# Patient Record
Sex: FEMALE | Race: BLACK OR AFRICAN AMERICAN | NOT HISPANIC OR LATINO | Employment: FULL TIME | URBAN - METROPOLITAN AREA
[De-identification: names, ages, dates, MRNs, and addresses within clinical notes are randomized per-mention and may not be internally consistent; named-entity substitution may affect disease eponyms.]

---

## 2017-02-01 ENCOUNTER — PATIENT OUTREACH (OUTPATIENT)
Dept: ADMINISTRATIVE | Facility: HOSPITAL | Age: 32
End: 2017-02-01

## 2017-03-01 ENCOUNTER — PATIENT OUTREACH (OUTPATIENT)
Dept: ADMINISTRATIVE | Facility: HOSPITAL | Age: 32
End: 2017-03-01

## 2017-03-01 DIAGNOSIS — Z00.00 HEALTHCARE MAINTENANCE: Primary | ICD-10-CM

## 2017-03-06 ENCOUNTER — OFFICE VISIT (OUTPATIENT)
Dept: OBSTETRICS AND GYNECOLOGY | Facility: CLINIC | Age: 32
End: 2017-03-06
Payer: COMMERCIAL

## 2017-03-06 VITALS
WEIGHT: 293 LBS | HEIGHT: 67 IN | BODY MASS INDEX: 45.99 KG/M2 | SYSTOLIC BLOOD PRESSURE: 122 MMHG | DIASTOLIC BLOOD PRESSURE: 70 MMHG

## 2017-03-06 DIAGNOSIS — N91.5 OLIGOMENORRHEA: Primary | ICD-10-CM

## 2017-03-06 PROCEDURE — 99214 OFFICE O/P EST MOD 30 MIN: CPT | Mod: S$GLB,,, | Performed by: OBSTETRICS & GYNECOLOGY

## 2017-03-06 PROCEDURE — 99999 PR PBB SHADOW E&M-EST. PATIENT-LVL II: CPT | Mod: PBBFAC,,, | Performed by: OBSTETRICS & GYNECOLOGY

## 2017-03-06 PROCEDURE — 1160F RVW MEDS BY RX/DR IN RCRD: CPT | Mod: S$GLB,,, | Performed by: OBSTETRICS & GYNECOLOGY

## 2017-03-06 NOTE — LETTER
March 6, 2017                     [facility]  [address] [facility] [address]  Phone: [phone]  Fax: [phone]   Patient: Ivana Blair   MR Number: [id number]   YOB: 1985   Date of Visit: 3/6/2017     To whom it may concern :     Ivana Blair was seen in clinic today and is able to return to work today 3/6/17. Feel free to call clinic at [phone]     Sincerely,      Nano Wolfe MA            CC  No Recipients    Enclosure

## 2017-03-06 NOTE — PROGRESS NOTES
CHIEF COMPLAINT:   Chief Complaint   Patient presents with    Gynecologic Exam       HISTORY OF PRESENT ILLNESS    Ivana Blair 32 y.o. G0 complains of: low libido, wt gain, rare periods  She also never had a pap smear as she says pelvic exams and intercourse (which she had just once) are too painful.     Menarche at 18yo, and menses then were q2mo, then stretched out to less frequent, then completely stopped at around age 22. Around then she had been given a provera trial  And bled after this.   Saw  Endocrinologists in past, seeing Mustapha now.   She had an expanding golf balled sized prolactinoma which was resected by Luz 2015 but not all was removed so needs to take twice weekly cabergoline however this is intermittent as it makes her very sleepy.   Eldorado once only 2006. Slightly painful but not excruciating, and also no sensation or pleasure, so did not want it again. No libido.   No abuse as a child.     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     HISTORY  Patient Active Problem List   Diagnosis    Benign prolactinoma       Past Medical History:   Diagnosis Date    Allergy     sinus    Anxiety     B12 deficiency     Benign prolactinoma     s/p surgery    Depression     Hx of varicella     Vitamin D deficiency        Past Surgical History:   Procedure Laterality Date    ADENOIDECTOMY      2007    NASAL JOSE MIGUEL BULLOSA RESECTION      2013    TONSILLECTOMY      2007    TUMOR REMOVAL  2/25/2015    Prolactinoma       Family History   Problem Relation Age of Onset    Anemia Mother     Gout Father     Cancer Maternal Grandfather      prostate    Cataracts Maternal Grandfather     Rheumatologic disease Paternal Grandmother     Cancer Paternal Grandfather      lung    Anemia Sister     Heart disease Maternal Aunt     Alzheimer's disease Maternal Grandmother        Social History     Social History    Marital status: Single     Spouse name: N/A    Number of children:  0    Years of education: N/A     Occupational History    [employer]     Social History Main Topics    Smoking status: Never Smoker    Smokeless tobacco: Never Used    Alcohol use No    Drug use: No    Sexual activity: Not Currently     Partners: Male     Birth control/ protection: None     Other Topics Concern    Are You Pregnant Or Think You May Be? No    Breast-Feeding No     Social History Narrative       No current outpatient prescriptions on file.     No current facility-administered medications for this visit.        Review of patient's allergies indicates:  No Known Allergies        PHYSICAL EXAM     Vitals:    03/06/17 1440   BP: 122/70       PAIN SCALE: 0/10 None    ROS:  GENERAL: No fever, chills, fatigability or weight loss.  ABDOMEN: Appetite fine. No weight loss. Denies diarrhea, abdominal pain, hematemesis or blood in stool.  URINARY: No flank pain, dysuria or hematuria.  BREASTS: Breasts symmetric, nontender and no lumps detected.    PE:   APPEARANCE: Well nourished, well developed, in no acute distress.        DIAGNOSIS:   1. Oligomenorrhea  2. History current prolactinemia, with hx resection of prolactinoma  3. obesity  4. Low libido    PLAN:   Pelvic u/s for EMS  Keep appt with DrCandice - pt needs to return to cabergoline to give this med a full trial (working around her job and travel days) and ask for alternative if there is no improvement of side eeffects.   Libido and menses irregularity may be due to her prolatin level.       COUNSELING:  Patient was counseled today on A.C.S. Pap guidelines and recommendations for yearly pelvic exams, mammograms and monthly self breast exams; to see her PCP for other health maintenance.     FOLLOW-UP: With me to review u/s results and to get a full annual, pap with small speculum.   Patient was counseled on above diagnoses (with over 50% of the time spent on education) during her   25 minute visit.

## 2017-03-06 NOTE — MR AVS SNAPSHOT
[facility]  [address] [facility] [address]  Phone: [phone]  Fax: [phone]                  Ivana Blair   3/6/2017 2:15 PM   Office Visit    Description:  Female : 1985   Provider:  Steph Thompson MD   Department:  [facility]           Reason for Visit     Gynecologic Exam           Diagnoses this Visit        Comments    Oligomenorrhea    -  Primary            To Do List           Future Appointments        Provider Department Dept Phone    3/8/2017 2:00 PM [facility] [phone]    3/9/2017 2:40 PM Prosper Bishop MD [place] Medicine [phone]    3/23/2017 10:45 AM Steph Thompson MD [facility] - OB/ -[phone]    3/27/2017 2:30 PM Nydia Chauhan MD [facility] - Dermatology [phone]      Goals (5 Years of Data)     None      [facility]     [facility] Nurse Care Line -  Assistance  Registered nurses in the [facility] provide clinical advisement, health education, appointment booking, and other advisory services.  Call for this free service at [phone].             Medications           Message regarding Medications     Verify the changes and/or additions to your medication regime listed below are the same as discussed with your clinician today.  If any of these changes or additions are incorrect, please notify your healthcare provider.        STOP taking these medications     cabergoline (DOSTINEX) 0.5 mg tablet Take 0.5 mg by mouth.    fluticasone (FLONASE) 50 mcg/actuation nasal spray 2 sprays by Each Nare route once daily.           Verify that the below list of medications is an accurate representation of the medications you are currently taking.  If none reported, the list may be blank. If incorrect, please contact your healthcare provider. Carry this list with you in case of emergency.           Current Medications            Clinical Reference Information     "       Your Vitals Were     BP Height Weight BMI       122/70 5' 7" (1.702 m) 132.9 kg (293 lb) 45.89 kg/m2       Blood Pressure          Most Recent Value    BP  122/70      Allergies as of 3/6/2017     No Known Allergies      Immunizations Administered on Date of Encounter - 3/6/2017     None      Orders Placed During Today's Visit     Future Labs/Procedures Expected by Expires    US OB/GYN Procedure ([facility])  As directed 3/6/2018      Language Assistance Services     ATTENTION: Language assistance services are available, free of charge. Please call [phone].      ATENCIÓN: Si habla español, tiene a lang disposición servicios gratuitos de asistencia lingüística. Llame al [phone].     CHÚ Ý: N?u b?n nói Ti?ng Vi?t, có các d?ch v? h? tr? ngôn ng? mi?n phí dành cho b?n. G?i s? [phone].         [facility] complies with applicable Federal civil rights laws and does not discriminate on the basis of race, color, national origin, age, disability, or sex.        "

## 2017-03-07 ENCOUNTER — TELEPHONE (OUTPATIENT)
Dept: RADIOLOGY | Facility: HOSPITAL | Age: 32
End: 2017-03-07

## 2017-03-07 NOTE — TELEPHONE ENCOUNTER
Patient returned call to Radiology Department to confirm appointment. Patient instructed to drink 32 ounces of water and to have it completed 1 hour prior appointment time. Hold bladder. Patient verbalized understanding and confirmed appointment for Ultrasound.

## 2017-03-08 ENCOUNTER — HOSPITAL ENCOUNTER (OUTPATIENT)
Dept: RADIOLOGY | Facility: HOSPITAL | Age: 32
Discharge: HOME | End: 2017-03-08
Attending: OBSTETRICS & GYNECOLOGY
Payer: COMMERCIAL

## 2017-03-08 ENCOUNTER — TELEPHONE (OUTPATIENT)
Dept: OBSTETRICS AND GYNECOLOGY | Facility: CLINIC | Age: 32
End: 2017-03-08

## 2017-03-08 DIAGNOSIS — N91.5 OLIGOMENORRHEA: Primary | ICD-10-CM

## 2017-03-08 DIAGNOSIS — N91.5 OLIGOMENORRHEA: ICD-10-CM

## 2017-03-10 ENCOUNTER — TELEPHONE (OUTPATIENT)
Dept: RADIOLOGY | Facility: HOSPITAL | Age: 32
End: 2017-03-10

## 2017-03-11 ENCOUNTER — PATIENT MESSAGE (OUTPATIENT)
Dept: OBSTETRICS AND GYNECOLOGY | Facility: CLINIC | Age: 32
End: 2017-03-11

## 2017-03-16 ENCOUNTER — TELEPHONE (OUTPATIENT)
Dept: RADIOLOGY | Facility: HOSPITAL | Age: 32
End: 2017-03-16

## 2017-03-20 ENCOUNTER — TELEPHONE (OUTPATIENT)
Dept: RADIOLOGY | Facility: HOSPITAL | Age: 32
End: 2017-03-20

## 2017-03-21 ENCOUNTER — OFFICE VISIT (OUTPATIENT)
Dept: DERMATOLOGY | Facility: CLINIC | Age: 32
End: 2017-03-21
Payer: COMMERCIAL

## 2017-03-21 ENCOUNTER — HOSPITAL ENCOUNTER (OUTPATIENT)
Dept: RADIOLOGY | Facility: HOSPITAL | Age: 32
Discharge: HOME | End: 2017-03-21
Attending: OBSTETRICS & GYNECOLOGY
Payer: COMMERCIAL

## 2017-03-21 DIAGNOSIS — L83 ACQUIRED ACANTHOSIS NIGRICANS: Primary | ICD-10-CM

## 2017-03-21 DIAGNOSIS — L81.0 POST-INFLAMMATORY HYPERPIGMENTATION: ICD-10-CM

## 2017-03-21 PROCEDURE — 76830 TRANSVAGINAL US NON-OB: CPT | Mod: 26,,, | Performed by: RADIOLOGY

## 2017-03-21 PROCEDURE — 1160F RVW MEDS BY RX/DR IN RCRD: CPT | Mod: S$GLB,,, | Performed by: DERMATOLOGY

## 2017-03-21 PROCEDURE — 76856 US EXAM PELVIC COMPLETE: CPT | Mod: 26,,, | Performed by: RADIOLOGY

## 2017-03-21 PROCEDURE — 99213 OFFICE O/P EST LOW 20 MIN: CPT | Mod: S$GLB,,, | Performed by: DERMATOLOGY

## 2017-03-21 PROCEDURE — 76856 US EXAM PELVIC COMPLETE: CPT | Mod: TC,PO

## 2017-03-21 PROCEDURE — 99999 PR PBB SHADOW E&M-EST. PATIENT-LVL II: CPT | Mod: PBBFAC,,, | Performed by: DERMATOLOGY

## 2017-03-21 RX ORDER — SALICYLIC ACID 6 G/100ML
LOTION TOPICAL
Qty: 414 ML | Refills: 3 | Status: SHIPPED | OUTPATIENT
Start: 2017-03-21 | End: 2018-03-29

## 2017-03-21 RX ORDER — CABERGOLINE 0.5 MG/1
0.25 TABLET ORAL
COMMUNITY
End: 2017-10-20

## 2017-03-21 RX ORDER — AMMONIUM LACTATE 12 G/100G
LOTION TOPICAL
Qty: 225 G | Refills: 11 | Status: SHIPPED | OUTPATIENT
Start: 2017-03-21 | End: 2017-08-21 | Stop reason: ALTCHOICE

## 2017-03-21 NOTE — MR AVS SNAPSHOT
[facility]  [address] [place] [address]  Phone: [phone]  Fax: [phone]                  Ivana Blair   3/21/2017 8:45 AM   Office Visit    Description:  Female : 1985   Provider:  Nydia Chauhan MD   Department:  [facility]           Reason for Visit     Follow-up           Diagnoses this Visit        Comments    Acquired acanthosis nigricans    -  Primary     Post-inflammatory hyperpigmentation                To Do List           Future Appointments        Provider Department Dept Phone    3/21/2017 2:45 PM [facility] [phone]    3/23/2017 10:45 AM Steph Thompson MD [facility] - OB/ -[phone]    3/28/2017 3:00 PM Prosper Bishop MD [place]-Internal Medicine [phone]      Goals (5 Years of Data)     None      Follow-Up and Disposition     Return if symptoms worsen or fail to improve.       These Medications        Disp Refills Start End    ammonium lactate (AMLACTIN) 12 % lotion 225 g 11 3/21/2017     Use daily.  Apply to damp skin after bathing.    [facility] [address] [place] [address] [address] [facility]  [place] Ph #: [phone]       salicylic acid 6 % Lotn 414 mL 3 3/21/2017     AAA of elbows and neck twice daily as tolerated    Pharmacy: [facility] [place] [address] Ph #: [phone]         [facility] On Call     [facility] Nurse Care Line -  Assistance  Registered nurses in the [facility] provide clinical advisement, health education, appointment booking, and other advisory services.  Call for this free service at [phone].             Medications           Message regarding Medications     Verify the changes and/or additions to your medication regime listed below are the same as discussed with your clinician today.  If any of these changes or additions are incorrect, please notify your healthcare  provider.        START taking these NEW medications        Refills    ammonium lactate (AMLACTIN) 12 % lotion 11    Sig: Use daily.  Apply to damp skin after bathing.    Class: Normal    salicylic acid 6 % Lotn 3    Sig: AAA of elbows and neck twice daily as tolerated    Class: Normal           Verify that the below list of medications is an accurate representation of the medications you are currently taking.  If none reported, the list may be blank. If incorrect, please contact your healthcare provider. Carry this list with you in case of emergency.           Current Medications     cabergoline (DOSTINEX) 0.5 mg tablet Take 0.25 mg by mouth twice a week.    ammonium lactate (AMLACTIN) 12 % lotion Use daily.  Apply to damp skin after bathing.    salicylic acid 6 % Lotn AAA of elbows and neck twice daily as tolerated           Clinical Reference Information           Allergies as of 3/21/2017     No Known Allergies      Immunizations Administered on Date of Encounter - 3/21/2017     None      Instructions    XEROSIS (DRY SKIN)      1. Definition    Xerosis is the term for dry skin.  We all have a natural oil coating over our skin produced by the skin oil glands.  If this oil is removed, the skin becomes dry which can lead to cracking, which can lead to inflammation.  Xerosis is usually a long-term problem that recurs often, especially in the winter.    2. Cause     Long hot baths or showers can remove our natural oil and lead to xerosis.  One should never take more than one bath or shower a day and for no longer than ten minutes.   Use of harsh soaps such as Zest, Dial, Latvian Spring, Lever and Ivory can worsen and cause xerosis.   Cold winter weather worsens xerosis because the amount of moisture contained in cold air is much less than the amount of moisture in warm air.    3. Treatment     Treatment is intended to restore the natural oil to your skin.  Keep the skin lubricated.     Do not take more than one  bath or shower a day.  Use lukewarm water, not hot.  Hot water dries out the skin.     Use a gentle moisturizing soap such as Cetaphil soap, Dove, or Cetaphil Restoraderm cleanser.     When toweling dry, dont rub.  Blot the skin so there is still some water left on the skin.  You should apply a moisturizing cream to all of the skin such as Cerave cream, Cetaphil cream, Restoraderm or Eucerin Original Formula cream.   Alpha hydroxyacid lotions, i.e., AmLactin, also work very well for preventing dry skin, but may burn when used on inflamed or reddened skin.      [facility]   DERMATOLOGY  [address] [place] [address]    Dept: [phone]   Dept Fax: [phone]           Language Assistance Services     ATTENTION: Language assistance services are available, free of charge. Please call [phone].      ATENCIÓN: Si habla jimena, tiene a lang disposición servicios gratuitos de asistencia lingüística. Llame al [phone].     CHÚ Ý: N?u b?n nói Ti?ng Vi?t, có các d?ch v? h? tr? ngôn ng? mi?n phí dành cho b?n. G?i s? [phone].         [facility] Dermatology complies with applicable Federal civil rights laws and does not discriminate on the basis of race, color, national origin, age, disability, or sex.

## 2017-03-21 NOTE — PATIENT INSTRUCTIONS
XEROSIS (DRY SKIN)      1. Definition    Xerosis is the term for dry skin.  We all have a natural oil coating over our skin produced by the skin oil glands.  If this oil is removed, the skin becomes dry which can lead to cracking, which can lead to inflammation.  Xerosis is usually a long-term problem that recurs often, especially in the winter.    2. Cause     Long hot baths or showers can remove our natural oil and lead to xerosis.  One should never take more than one bath or shower a day and for no longer than ten minutes.   Use of harsh soaps such as Zest, Dial, Radha Spring, Lever and Ivory can worsen and cause xerosis.   Cold winter weather worsens xerosis because the amount of moisture contained in cold air is much less than the amount of moisture in warm air.    3. Treatment     Treatment is intended to restore the natural oil to your skin.  Keep the skin lubricated.     Do not take more than one bath or shower a day.  Use lukewarm water, not hot.  Hot water dries out the skin.     Use a gentle moisturizing soap such as Cetaphil soap, Dove, or Cetaphil Restoraderm cleanser.     When toweling dry, dont rub.  Blot the skin so there is still some water left on the skin.  You should apply a moisturizing cream to all of the skin such as Cerave cream, Cetaphil cream, Restoraderm or Eucerin Original Formula cream.   Alpha hydroxyacid lotions, i.e., AmLactin, also work very well for preventing dry skin, but may burn when used on inflamed or reddened skin.      [facility]   DERMATOLOGY  [address] [facility] [place] [address]    Dept: [phone]   Dept Fax: [phone]

## 2017-03-21 NOTE — PROGRESS NOTES
Subjective:       Patient ID:  Ivana Blair is a 32 y.o. female who presents for   Chief Complaint   Patient presents with    Follow-up     f/u on dark spots      HPI Comments: Hx of benign prolactinoma and acanthosis nigricans, last seen on 10/20/15.  She has a hx of AN of the bilateral elbows, posterior neck and bilateral feet.  She has been using compounded HQ cream every other night.  + improvement with lightening of areas.  She c/o new lesions of the left leg.       Review of Systems   Constitutional: Negative for fever and chills.   Gastrointestinal: Negative for nausea and vomiting.   Skin: Positive for rash. Negative for itching, daily sunscreen use, activity-related sunscreen use and recent sunburn.   Hematologic/Lymphatic: Does not bruise/bleed easily.        Objective:    Physical Exam   Constitutional: She appears well-developed and well-nourished. No distress.   Neurological: She is alert and oriented to person, place, and time. She is not disoriented.   Psychiatric: She has a normal mood and affect.   Skin:   Areas Examined (abnormalities noted in diagram):   Head / Face Inspection Performed  Neck Inspection Performed  Chest / Axilla Inspection Performed  Abdomen Inspection Performed  Back Inspection Performed  RUE Inspected  LUE Inspection Performed  Nails and Digits Inspection Performed               Assessment / Plan:        Acquired acanthosis nigricans  -     ammonium lactate (AMLACTIN) 12 % lotion; Use daily.  Apply to damp skin after bathing.  Dispense: 225 g; Refill: 11  -     salicylic acid 6 % Lotn; AAA of elbows and neck twice daily as tolerated  Dispense: 414 mL; Refill: 3  -     Pt unable to get urea cream.  Will start above meds instead.     Post-inflammatory hyperpigmentation  -     Of the lower legs, will start HQ 8%.            Return if symptoms worsen or fail to improve.

## 2017-03-21 NOTE — LETTER
March 21, 2017      [facility] - Dermatology  [address] [place] [address]  Phone: [phone]  Fax: [phone]       Patient: Ivana Blair   YOB: 1985  Date of Visit: 03/21/2017    To Whom It May Concern:    Ivana was at [facility] on 03/21/2017. She may return to work on 03/21/2017with no restrictions. If you have any questions or concerns, or if I can be of further assistance, please do not hesitate to contact me.    Sincerely,    Geraldine Raygoza LPN

## 2017-03-24 ENCOUNTER — PATIENT MESSAGE (OUTPATIENT)
Dept: OBSTETRICS AND GYNECOLOGY | Facility: CLINIC | Age: 32
End: 2017-03-24

## 2017-03-24 ENCOUNTER — PATIENT MESSAGE (OUTPATIENT)
Dept: DERMATOLOGY | Facility: CLINIC | Age: 32
End: 2017-03-24

## 2017-03-24 ENCOUNTER — TELEPHONE (OUTPATIENT)
Dept: OBSTETRICS AND GYNECOLOGY | Facility: CLINIC | Age: 32
End: 2017-03-24

## 2017-03-24 NOTE — TELEPHONE ENCOUNTER
----- Message from Geraldine Viveros sent at 3/24/2017 11:51 AM CDT -----  Contact: pt   Pt request call back concerning test results, pt can be reached at  [phone]///[email or web address]

## 2017-03-27 ENCOUNTER — PATIENT MESSAGE (OUTPATIENT)
Dept: INTERNAL MEDICINE | Facility: CLINIC | Age: 32
End: 2017-03-27

## 2017-03-27 NOTE — TELEPHONE ENCOUNTER
Allergy list updated. I wasn't able to update list with dust allergy, it wasn't a recognized agent.

## 2017-03-29 ENCOUNTER — TELEPHONE (OUTPATIENT)
Dept: DERMATOLOGY | Facility: CLINIC | Age: 32
End: 2017-03-29

## 2017-04-01 ENCOUNTER — PATIENT MESSAGE (OUTPATIENT)
Dept: DERMATOLOGY | Facility: CLINIC | Age: 32
End: 2017-04-01

## 2017-05-09 ENCOUNTER — OFFICE VISIT (OUTPATIENT)
Dept: INTERNAL MEDICINE | Facility: CLINIC | Age: 32
End: 2017-05-09
Payer: COMMERCIAL

## 2017-05-09 VITALS
WEIGHT: 293 LBS | DIASTOLIC BLOOD PRESSURE: 82 MMHG | SYSTOLIC BLOOD PRESSURE: 118 MMHG | TEMPERATURE: 97 F | HEIGHT: 67 IN | BODY MASS INDEX: 45.99 KG/M2

## 2017-05-09 DIAGNOSIS — Z00.00 ANNUAL PHYSICAL EXAM: Primary | ICD-10-CM

## 2017-05-09 PROCEDURE — 99395 PREV VISIT EST AGE 18-39: CPT | Mod: S$GLB,,, | Performed by: FAMILY MEDICINE

## 2017-05-09 PROCEDURE — 99999 PR PBB SHADOW E&M-EST. PATIENT-LVL III: CPT | Mod: PBBFAC,,, | Performed by: FAMILY MEDICINE

## 2017-05-09 NOTE — MR AVS SNAPSHOT
[facility]  [address] [place] [place] [address]  Phone: [phone]  Fax: [phone]                  Ivana Blair   2017 2:00 PM   Office Visit    Description:  Female : 1985   Provider:  Prosper Bishop MD   [facility]           Reason for Visit     Annual Exam           Diagnoses this Visit        Comments    Annual physical exam    -  Primary            To Do List           Future Appointments        Provider Department Dept Phone    5/10/2017 9:40 AM LABORATORY, [facility] [phone]    2017 1:15 PM Steph Thompson MD [facility] - OB/ -[phone]      Goals (5 Years of Data)     None      [facility]     [facility] On [facility] Nurse Care Line -  Assistance  Unless otherwise directed by your provider, please contact [facility], our nurse care line that is available for  assistance.     Registered nurses in the [facility] provide: appointment scheduling, clinical advisement, health education, and other advisory services.  Call: [phone] (toll free)               Medications           Message regarding Medications     Verify the changes and/or additions to your medication regime listed below are the same as discussed with your clinician today.  If any of these changes or additions are incorrect, please notify your healthcare provider.             Verify that the below list of medications is an accurate representation of the medications you are currently taking.  If none reported, the list may be blank. If incorrect, please contact your healthcare provider. Carry this list with you in case of emergency.           Current Medications     ammonium lactate (AMLACTIN) 12 % lotion Use daily.  Apply to damp skin after bathing.    cabergoline (DOSTINEX) 0.5 mg tablet Take 0.25 mg by mouth twice a week.    salicylic acid 6 % Lotn AAA of elbows and neck twice daily as  "tolerated           Clinical Reference Information           Your Vitals Were     BP Temp Height Weight BMI    118/82 (BP Location: Left arm, Patient Position: Sitting, BP Method: Manual) 97 °F (36.1 °C) (Tympanic) 5' 7" (1.702 m) 134.2 kg (295 lb 13.7 oz) 46.34 kg/m2      Blood Pressure          Most Recent Value    BP  118/82      Allergies as of 5/9/2017     Pollen,fermented      Immunizations Administered on Date of Encounter - 5/9/2017     None      Orders Placed During Today's Visit     Future Labs/Procedures Expected by Expires    Prolactin  5/9/2017 7/8/2018    Vitamin D  5/9/2017 8/7/2017    CBC auto differential  5/10/2017 6/9/2017    Comprehensive metabolic panel  5/10/2017 6/9/2017    Hemoglobin A1c  5/10/2017 6/9/2017    INSULIN, RANDOM  5/10/2017 6/9/2017    TSH  5/10/2017 6/9/2017      Language Assistance Services     ATTENTION: Language assistance services are available, free of charge. Please call [phone].      ATENCIÓN: Si habla español, tiene a lang disposición servicios gratuitos de asistencia lingüística. Llame al [phone].     CHÚ Ý: N?u b?n nói Ti?ng Vi?t, có các d?ch v? h? tr? ngôn ng? mi?n phí dành cho b?n. G?i s? [phone].         [facility] complies with applicable Federal civil rights laws and does not discriminate on the basis of race, color, national origin, age, disability, or sex.        "

## 2017-05-10 ENCOUNTER — LAB VISIT (OUTPATIENT)
Dept: LAB | Facility: HOSPITAL | Age: 32
End: 2017-05-10
Attending: FAMILY MEDICINE
Payer: COMMERCIAL

## 2017-05-10 DIAGNOSIS — Z00.00 ANNUAL PHYSICAL EXAM: ICD-10-CM

## 2017-05-10 LAB
25(OH)D3+25(OH)D2 SERPL-MCNC: 11 NG/ML
ALBUMIN SERPL BCP-MCNC: 3.6 G/DL
ALP SERPL-CCNC: 52 U/L
ALT SERPL W/O P-5'-P-CCNC: 16 U/L
ANION GAP SERPL CALC-SCNC: 9 MMOL/L
AST SERPL-CCNC: 18 U/L
BASOPHILS # BLD AUTO: 0.02 K/UL
BASOPHILS NFR BLD: 0.2 %
BILIRUB SERPL-MCNC: 0.2 MG/DL
BUN SERPL-MCNC: 11 MG/DL
CALCIUM SERPL-MCNC: 9.6 MG/DL
CHLORIDE SERPL-SCNC: 105 MMOL/L
CO2 SERPL-SCNC: 26 MMOL/L
CREAT SERPL-MCNC: 1 MG/DL
DIFFERENTIAL METHOD: ABNORMAL
EOSINOPHIL # BLD AUTO: 0.1 K/UL
EOSINOPHIL NFR BLD: 1.6 %
ERYTHROCYTE [DISTWIDTH] IN BLOOD BY AUTOMATED COUNT: 14.6 %
EST. GFR  (AFRICAN AMERICAN): >60 ML/MIN/1.73 M^2
EST. GFR  (NON AFRICAN AMERICAN): >60 ML/MIN/1.73 M^2
GLUCOSE SERPL-MCNC: 96 MG/DL
HCT VFR BLD AUTO: 39.2 %
HGB BLD-MCNC: 12 G/DL
INSULIN COLLECTION INTERVAL: NORMAL
INSULIN SERPL-ACNC: 22 UU/ML
LYMPHOCYTES # BLD AUTO: 2.9 K/UL
LYMPHOCYTES NFR BLD: 36 %
MCH RBC QN AUTO: 26.1 PG
MCHC RBC AUTO-ENTMCNC: 30.6 %
MCV RBC AUTO: 85 FL
MONOCYTES # BLD AUTO: 0.7 K/UL
MONOCYTES NFR BLD: 8.8 %
NEUTROPHILS # BLD AUTO: 4.3 K/UL
NEUTROPHILS NFR BLD: 53.2 %
PLATELET # BLD AUTO: 311 K/UL
PMV BLD AUTO: 11.5 FL
POTASSIUM SERPL-SCNC: 4.4 MMOL/L
PROLACTIN SERPL IA-MCNC: 284 NG/ML
PROT SERPL-MCNC: 7.2 G/DL
RBC # BLD AUTO: 4.6 M/UL
SODIUM SERPL-SCNC: 140 MMOL/L
TSH SERPL DL<=0.005 MIU/L-ACNC: 1.21 UIU/ML
WBC # BLD AUTO: 8.16 K/UL

## 2017-05-10 PROCEDURE — 80053 COMPREHEN METABOLIC PANEL: CPT

## 2017-05-10 PROCEDURE — 85025 COMPLETE CBC W/AUTO DIFF WBC: CPT

## 2017-05-10 PROCEDURE — 36415 COLL VENOUS BLD VENIPUNCTURE: CPT | Mod: PO

## 2017-05-10 PROCEDURE — 83036 HEMOGLOBIN GLYCOSYLATED A1C: CPT

## 2017-05-10 PROCEDURE — 84146 ASSAY OF PROLACTIN: CPT

## 2017-05-10 PROCEDURE — 84443 ASSAY THYROID STIM HORMONE: CPT

## 2017-05-10 PROCEDURE — 83525 ASSAY OF INSULIN: CPT

## 2017-05-10 PROCEDURE — 82306 VITAMIN D 25 HYDROXY: CPT

## 2017-05-11 ENCOUNTER — PATIENT MESSAGE (OUTPATIENT)
Dept: INTERNAL MEDICINE | Facility: CLINIC | Age: 32
End: 2017-05-11

## 2017-05-11 LAB
ESTIMATED AVG GLUCOSE: 131 MG/DL
HBA1C MFR BLD HPLC: 6.2 %

## 2017-05-11 NOTE — PROGRESS NOTES
"Subjective:      Patient ID: Ivana Blair is a 32 y.o. female.    Chief Complaint: Annual Exam    HPI  31 yo female with hx of prolactinoma here for annual.  Been seeing Gyn for amenorrhea.    Sees Endocrine as well.  Weight is going up, she is worried about DM.  Diet is not good.  She is doing kickboxing/total gym and trying to walk at work.  Upcoming pap with Gyn    Past Medical History:   Diagnosis Date    Allergy     sinus    Anxiety     B12 deficiency     Benign prolactinoma     s/p surgery    Depression     Hx of varicella     Vitamin D deficiency      Family History   Problem Relation Age of Onset    Anemia Mother     Gout Father     Cancer Maternal Grandfather      prostate    Cataracts Maternal Grandfather     Rheumatologic disease Paternal Grandmother     Cancer Paternal Grandfather      lung    Anemia Sister     Heart disease Maternal Aunt     Alzheimer's disease Maternal Grandmother      Past Surgical History:   Procedure Laterality Date    ADENOIDECTOMY      2007    NASAL JOSE MIGUEL BULLOSA RESECTION      2013    TONSILLECTOMY      2007    TUMOR REMOVAL  2/25/2015    Prolactinoma     Social History   Substance Use Topics    Smoking status: Never Smoker    Smokeless tobacco: Never Used    Alcohol use No       /82 (BP Location: Left arm, Patient Position: Sitting, BP Method: Manual)  Temp 97 °F (36.1 °C) (Tympanic)   Ht 5' 7" (1.702 m)  Wt 134.2 kg (295 lb 13.7 oz)  BMI 46.34 kg/m2    Review of Systems   Constitutional: Positive for unexpected weight change. Negative for activity change, appetite change, chills, diaphoresis, fatigue and fever.   HENT: Negative for ear pain, hearing loss, postnasal drip, rhinorrhea and tinnitus.    Eyes: Negative for visual disturbance.   Respiratory: Negative for cough, shortness of breath and wheezing.    Cardiovascular: Negative for chest pain, palpitations and leg swelling.   Gastrointestinal: Negative for abdominal distention, " abdominal pain, constipation and diarrhea.   Endocrine: Positive for polydipsia and polyuria.   Genitourinary: Negative for dysuria, frequency, hematuria and urgency.   Musculoskeletal: Negative for back pain and joint swelling.   Skin: Negative.    Neurological: Negative for weakness and headaches.   Psychiatric/Behavioral: Negative.      Objective:     Physical Exam   Constitutional: She is oriented to person, place, and time. She appears well-developed and well-nourished. No distress.   HENT:   Right Ear: External ear normal.   Left Ear: External ear normal.   Nose: Nose normal.   Mouth/Throat: Oropharynx is clear and moist.   Eyes: Pupils are equal, round, and reactive to light.   Neck: Normal range of motion. Neck supple. No thyromegaly present.   Cardiovascular: Normal rate, regular rhythm and normal heart sounds.    Pulmonary/Chest: Effort normal and breath sounds normal. No respiratory distress. She has no wheezes. She has no rales.   Abdominal: Soft. Bowel sounds are normal. She exhibits no distension. There is no tenderness.   Musculoskeletal: She exhibits no edema.   Neurological: She is alert and oriented to person, place, and time. No cranial nerve deficit.   Skin: Skin is warm and dry. No rash noted.   +acanthosis nigrans   Psychiatric: She has a normal mood and affect. Her behavior is normal. Judgment and thought content normal.   Nursing note and vitals reviewed.      Lab Results   Component Value Date    WBC 8.16 05/10/2017    HGB 12.0 05/10/2017    HCT 39.2 05/10/2017     05/10/2017    CHOL 152 01/08/2015    TRIG 59 01/08/2015    HDL 60 01/08/2015    ALT 16 05/10/2017    AST 18 05/10/2017     05/10/2017    K 4.4 05/10/2017     05/10/2017    CREATININE 1.0 05/10/2017    BUN 11 05/10/2017    CO2 26 05/10/2017    TSH 1.211 05/10/2017    INR 0.9 01/08/2015       Assessment:     1. Annual physical exam       Plan:   Annual physical exam  -     CBC auto differential; Future; Expected  date: 5/10/17  -     Comprehensive metabolic panel; Future; Expected date: 5/10/17  -     TSH; Future; Expected date: 5/10/17  -     Cancel: Lipid panel; Future; Expected date: 5/10/17  -     Hemoglobin A1c; Future; Expected date: 5/10/17  -     INSULIN, RANDOM; Future; Expected date: 5/10/17  -     Prolactin; Future; Expected date: 5/9/17  -     Vitamin D; Future; Expected date: 5/9/17    Update labs  Good diet/exercise for weight loss  OA good idea  Consider Adipex  F/u annually and PRN

## 2017-05-12 ENCOUNTER — PATIENT MESSAGE (OUTPATIENT)
Dept: INTERNAL MEDICINE | Facility: CLINIC | Age: 32
End: 2017-05-12

## 2017-05-12 DIAGNOSIS — R73.03 PREDIABETES: Primary | ICD-10-CM

## 2017-05-12 DIAGNOSIS — E88.819 INSULIN RESISTANCE: ICD-10-CM

## 2017-05-12 RX ORDER — ERGOCALCIFEROL 1.25 MG/1
50000 CAPSULE ORAL WEEKLY
Qty: 12 CAPSULE | Refills: 0 | Status: SHIPPED | OUTPATIENT
Start: 2017-05-12 | End: 2017-06-11

## 2017-05-12 RX ORDER — METFORMIN HYDROCHLORIDE 500 MG/1
500 TABLET ORAL 2 TIMES DAILY WITH MEALS
Qty: 180 TABLET | Refills: 3 | Status: SHIPPED | OUTPATIENT
Start: 2017-05-12 | End: 2017-10-20

## 2017-05-16 ENCOUNTER — PATIENT MESSAGE (OUTPATIENT)
Dept: INTERNAL MEDICINE | Facility: CLINIC | Age: 32
End: 2017-05-16

## 2017-08-21 ENCOUNTER — OFFICE VISIT (OUTPATIENT)
Dept: INTERNAL MEDICINE | Facility: CLINIC | Age: 32
End: 2017-08-21
Payer: COMMERCIAL

## 2017-08-21 ENCOUNTER — HOSPITAL ENCOUNTER (OUTPATIENT)
Dept: RADIOLOGY | Facility: HOSPITAL | Age: 32
Discharge: HOME | End: 2017-08-21
Attending: PHYSICIAN ASSISTANT
Payer: COMMERCIAL

## 2017-08-21 VITALS
HEIGHT: 67 IN | WEIGHT: 288 LBS | SYSTOLIC BLOOD PRESSURE: 138 MMHG | TEMPERATURE: 97 F | DIASTOLIC BLOOD PRESSURE: 82 MMHG | HEART RATE: 60 BPM | BODY MASS INDEX: 45.2 KG/M2

## 2017-08-21 DIAGNOSIS — S69.91XA HAND INJURY, RIGHT, INITIAL ENCOUNTER: ICD-10-CM

## 2017-08-21 DIAGNOSIS — S69.91XA HAND INJURY, RIGHT, INITIAL ENCOUNTER: Primary | ICD-10-CM

## 2017-08-21 DIAGNOSIS — R09.81 SINUS CONGESTION: ICD-10-CM

## 2017-08-21 PROCEDURE — 73130 X-RAY EXAM OF HAND: CPT | Mod: TC,PO,RT

## 2017-08-21 PROCEDURE — 3008F BODY MASS INDEX DOCD: CPT | Mod: S$GLB,,, | Performed by: PHYSICIAN ASSISTANT

## 2017-08-21 PROCEDURE — 73130 X-RAY EXAM OF HAND: CPT | Mod: 26,RT,, | Performed by: RADIOLOGY

## 2017-08-21 PROCEDURE — 99213 OFFICE O/P EST LOW 20 MIN: CPT | Mod: S$GLB,,, | Performed by: PHYSICIAN ASSISTANT

## 2017-08-21 PROCEDURE — 99999 PR PBB SHADOW E&M-EST. PATIENT-LVL III: CPT | Mod: PBBFAC,,, | Performed by: PHYSICIAN ASSISTANT

## 2017-08-21 NOTE — PROGRESS NOTES
Subjective:       Patient ID: Ivana Blair is a 32 y.o. female.    Chief Complaint: Hand Pain and Nasal Congestion    Hand Pain    Incident onset: 1 week ago  The incident occurred at work. Injury mechanism: hit between two refridgerators  The pain is present in the right hand. The quality of the pain is described as aching and shooting. The pain does not radiate. The pain is at a severity of 4/10. The pain is mild. The pain has been fluctuating since the incident. Pertinent negatives include no chest pain, muscle weakness, numbness or tingling. The symptoms are aggravated by palpation. She has tried ice for the symptoms. The treatment provided mild relief.   URI    This is a new problem. The current episode started today. The problem has been unchanged. There has been no fever. Associated symptoms include congestion, headaches, a plugged ear sensation and sinus pain. Pertinent negatives include no abdominal pain, chest pain, coughing, diarrhea, dysuria, ear pain, joint pain, joint swelling, nausea, neck pain, rash, rhinorrhea, sneezing, sore throat, swollen glands, vomiting or wheezing. She has tried nothing for the symptoms.       Past Medical History:   Diagnosis Date    Allergy     sinus    Anxiety     B12 deficiency     Benign prolactinoma     s/p surgery    Depression     Hx of varicella     Vitamin D deficiency        Current Outpatient Prescriptions   Medication Sig Dispense Refill    cabergoline (DOSTINEX) 0.5 mg tablet Take 0.25 mg by mouth twice a week.      metformin (GLUCOPHAGE) 500 MG tablet Take 1 tablet (500 mg total) by mouth 2 (two) times daily with meals. 180 tablet 3    salicylic acid 6 % Lotn AAA of elbows and neck twice daily as tolerated 414 mL 3     No current facility-administered medications for this visit.        Review of Systems   HENT: Positive for congestion. Negative for ear pain, rhinorrhea, sneezing and sore throat.    Respiratory: Negative for cough and wheezing.   "  Cardiovascular: Negative for chest pain.   Gastrointestinal: Negative for abdominal pain, diarrhea, nausea and vomiting.   Genitourinary: Negative for dysuria.   Musculoskeletal: Negative for joint pain and neck pain.   Skin: Negative for rash.   Neurological: Positive for headaches. Negative for tingling and numbness.       Objective:   /82   Pulse 60   Temp 97.4 °F (36.3 °C) (Tympanic)   Ht 5' 6.5" (1.689 m)   Wt 130.6 kg (288 lb)   BMI 45.79 kg/m²      Physical Exam   Constitutional: She is oriented to person, place, and time. She appears well-developed and well-nourished. No distress.   HENT:   Head: Normocephalic and atraumatic.   Right Ear: Hearing, tympanic membrane, external ear and ear canal normal.   Left Ear: Hearing, tympanic membrane, external ear and ear canal normal.   Nose: No sinus tenderness. Right sinus exhibits no maxillary sinus tenderness and no frontal sinus tenderness. Left sinus exhibits no maxillary sinus tenderness and no frontal sinus tenderness.   Mouth/Throat: Uvula is midline, oropharynx is clear and moist and mucous membranes are normal. No oropharyngeal exudate, posterior oropharyngeal edema, posterior oropharyngeal erythema or tonsillar abscesses.   Eyes: Conjunctivae are normal. Pupils are equal, round, and reactive to light.   Neck: Normal range of motion. Neck supple.   Cardiovascular: Normal rate, regular rhythm and normal heart sounds.    Pulmonary/Chest: Effort normal and breath sounds normal. No respiratory distress.   Musculoskeletal:        Right hand: She exhibits tenderness and bony tenderness. She exhibits normal range of motion, normal two-point discrimination, normal capillary refill, no deformity, no laceration and no swelling. Normal sensation noted. Normal strength noted.        Hands:  Neurological: She is alert and oriented to person, place, and time.   Skin: Skin is warm.   Psychiatric: She has a normal mood and affect. Her behavior is normal. " Judgment and thought content normal.   Vitals reviewed.        Lab Results   Component Value Date    WBC 8.16 05/10/2017    HGB 12.0 05/10/2017    HCT 39.2 05/10/2017     05/10/2017    CHOL 152 01/08/2015    TRIG 59 01/08/2015    HDL 60 01/08/2015    ALT 16 05/10/2017    AST 18 05/10/2017     05/10/2017    K 4.4 05/10/2017     05/10/2017    CREATININE 1.0 05/10/2017    BUN 11 05/10/2017    CO2 26 05/10/2017    TSH 1.211 05/10/2017    INR 0.9 01/08/2015    HGBA1C 6.2 05/10/2017       Assessment:       1. Hand injury, right, initial encounter    2. Sinus congestion        Plan:   Hand injury, right, initial encounter  -     X-Ray Hand Complete Right; Future; Expected date: 08/21/2017  No fracture, wear brace for support, likely bone contusion.  RICE therapy   Sinus congestion- start OTC allergy/congestion medication

## 2017-08-21 NOTE — LETTER
August 21, 2017      [facility]  [address] [place] [address]  Phone: [phone]  Fax: [phone]       Patient: Ivana Blair   YOB: 1985  Date of Visit: 08/21/2017    To Whom It May Concern:    Prieto Blair  was at [facility] on 08/21/2017. She may return to work/school on 8/22/17 with no restrictions. If you have any questions or concerns, or if I can be of further assistance, please do not hesitate to contact me.    Sincerely,    Cami Moe LPN

## 2017-09-21 ENCOUNTER — OFFICE VISIT (OUTPATIENT)
Dept: INTERNAL MEDICINE | Facility: CLINIC | Age: 32
End: 2017-09-21
Payer: COMMERCIAL

## 2017-09-21 VITALS
BODY MASS INDEX: 45.52 KG/M2 | HEIGHT: 67 IN | DIASTOLIC BLOOD PRESSURE: 84 MMHG | WEIGHT: 290 LBS | HEART RATE: 87 BPM | SYSTOLIC BLOOD PRESSURE: 130 MMHG | TEMPERATURE: 100 F | OXYGEN SATURATION: 98 %

## 2017-09-21 DIAGNOSIS — Z23 NEEDS FLU SHOT: ICD-10-CM

## 2017-09-21 DIAGNOSIS — Z00.00 EXAMINATION: Primary | ICD-10-CM

## 2017-09-21 PROCEDURE — 99213 OFFICE O/P EST LOW 20 MIN: CPT | Mod: S$GLB,,, | Performed by: NURSE PRACTITIONER

## 2017-09-21 PROCEDURE — 99999 PR PBB SHADOW E&M-EST. PATIENT-LVL III: CPT | Mod: PBBFAC,,, | Performed by: NURSE PRACTITIONER

## 2017-09-21 PROCEDURE — 3008F BODY MASS INDEX DOCD: CPT | Mod: S$GLB,,, | Performed by: NURSE PRACTITIONER

## 2017-09-25 ENCOUNTER — TELEPHONE (OUTPATIENT)
Dept: INTERNAL MEDICINE | Facility: CLINIC | Age: 32
End: 2017-09-25

## 2017-09-25 NOTE — TELEPHONE ENCOUNTER
----- Message from Sandra Jj sent at 9/25/2017 11:30 AM CDT -----  Contact: pt  She's calling to reschedule lab appointment to December, please re-add orders and advise [phone] (home)

## 2017-09-25 NOTE — TELEPHONE ENCOUNTER
Attempted to contact pt, recording states, number is not reachable. Message sent to pt via portal.

## 2017-09-25 NOTE — PROGRESS NOTES
Subjective:       Patient ID: Ivana Blair is a 32 y.o. female.    Chief Complaint: Immunizations (booster vaccine possibly)    Patient presents for immunizations.  Reports that's she's going to [place] to assist in the post diaster.  Inquiring about tetanus and influenza vaccines.  Has had the tetanus at another facility in 2013.      Review of Systems   Constitutional: Negative for chills and fever.   Eyes: Negative for discharge and redness.   Respiratory: Negative for cough.    Gastrointestinal: Negative for diarrhea, nausea and vomiting.   Skin: Negative for color change and rash.   Psychiatric/Behavioral: Negative for agitation and confusion.       Objective:      Physical Exam   Constitutional: She is oriented to person, place, and time. She appears well-developed and well-nourished.   HENT:   Head: Normocephalic and atraumatic.   Right Ear: Hearing, tympanic membrane, external ear and ear canal normal.   Left Ear: Hearing, tympanic membrane, external ear and ear canal normal.   Nose: Nose normal.   Mouth/Throat: Uvula is midline and oropharynx is clear and moist.   Neck: Normal range of motion.   Cardiovascular: Normal rate and regular rhythm.    Pulmonary/Chest: Effort normal and breath sounds normal.   Musculoskeletal: Normal range of motion. She exhibits no edema.   Neurological: She is alert and oriented to person, place, and time.   Skin: Skin is warm. No erythema.   Psychiatric: She has a normal mood and affect. Her behavior is normal.       Assessment:       1. Examination    2. Needs flu shot        Plan:         Examination    Needs flu shot      Patient had no other complaints.  Had low grade temp in clinic.  Instructed to monitor over the weekend and follow up if needed for leaving for [place].  Instructed to go to the pharmacy ([facility]) for the influenza vaccine.

## 2017-10-19 ENCOUNTER — NURSE TRIAGE (OUTPATIENT)
Dept: ADMINISTRATIVE | Facility: CLINIC | Age: 32
End: 2017-10-19

## 2017-10-20 ENCOUNTER — OFFICE VISIT (OUTPATIENT)
Dept: INTERNAL MEDICINE | Facility: CLINIC | Age: 32
End: 2017-10-20
Payer: COMMERCIAL

## 2017-10-20 VITALS
HEIGHT: 66 IN | BODY MASS INDEX: 46.21 KG/M2 | HEART RATE: 102 BPM | TEMPERATURE: 97 F | WEIGHT: 287.5 LBS | DIASTOLIC BLOOD PRESSURE: 80 MMHG | OXYGEN SATURATION: 98 % | SYSTOLIC BLOOD PRESSURE: 118 MMHG

## 2017-10-20 DIAGNOSIS — J01.90 ACUTE BACTERIAL RHINOSINUSITIS: Primary | ICD-10-CM

## 2017-10-20 DIAGNOSIS — B96.89 ACUTE BACTERIAL RHINOSINUSITIS: Primary | ICD-10-CM

## 2017-10-20 PROCEDURE — 99214 OFFICE O/P EST MOD 30 MIN: CPT | Mod: S$GLB,,, | Performed by: FAMILY MEDICINE

## 2017-10-20 PROCEDURE — 99999 PR PBB SHADOW E&M-EST. PATIENT-LVL III: CPT | Mod: PBBFAC,,, | Performed by: FAMILY MEDICINE

## 2017-10-20 RX ORDER — AMOXICILLIN AND CLAVULANATE POTASSIUM 875; 125 MG/1; MG/1
1 TABLET, FILM COATED ORAL 2 TIMES DAILY
Qty: 10 TABLET | Refills: 0 | Status: SHIPPED | OUTPATIENT
Start: 2017-10-20 | End: 2017-10-25

## 2017-10-20 RX ORDER — FLUTICASONE PROPIONATE 50 MCG
2 SPRAY, SUSPENSION (ML) NASAL DAILY
Qty: 1 BOTTLE | Refills: 0 | Status: SHIPPED | OUTPATIENT
Start: 2017-10-20 | End: 2018-03-29

## 2017-10-20 NOTE — LETTER
October 20, 2017                 [facility]  Internal Medicine  [address] [place] [address]  Phone: [phone]  Fax: [phone]   October 20, 2017     Patient: Ivana Blair   YOB: 1985   Date of Visit: 10/20/2017       To Whom it May Concern:    Ivana Blair was seen in my clinic on 10/20/2017. She may return to work on 10/20/2017.    If you have any questions or concerns, please don't hesitate to call.    Sincerely,         John Mittal MD/Samara Burrows LPN

## 2017-10-20 NOTE — TELEPHONE ENCOUNTER
Reason for Disposition   Severe pain in one eye    Protocols used:  [id number]-AH    Ivana reporting 8/10 headache pain that increases to 10/10 when straining or standing up. She states she also feels this pain along her forehead and behind her right eye.  She does report a history of chronic sinusitis and also history of brain tumor removed in 2015. She has photophobia with this headache as well. She states she has never had a headache like this before in her life. She denies other pertinent symptoms as documented. Advised to be seen in ED for evaluation tonight per protocol and she agrees to plan. Please contact caller with any further care advice.

## 2017-10-20 NOTE — PROGRESS NOTES
Subjective:   Patient ID: Ivana Blair is a 32 y.o. female.  Chief Complaint:  Sinus Problem and Headache      Resents for evaluation of possible sinus infection.  PCP Dr. Bishop.  Medical history for prediabetes, vitamin d insufficiency, and hyperprolactinemia with pituitary adenoma and partial rescetion.  Not taking any medicines as previously prescribed  No additional complaints or concerns today      Sinus Problem   This is a new problem. The current episode started in the past 7 days. The problem has been gradually worsening since onset. The maximum temperature recorded prior to her arrival was 100.4 - 100.9 F. The fever has been present for 1 to 2 days. Her pain is at a severity of 6/10. The pain is moderate. Associated symptoms include congestion, coughing, ear pain, headaches (Right formtal/maxillary), sinus pressure and a sore throat. Pertinent negatives include no chills, diaphoresis, hoarse voice, neck pain, shortness of breath or sneezing. Past treatments include nothing.       Current Outpatient Prescriptions:     salicylic acid 6 % Lotn, AAA of elbows and neck twice daily as tolerated, Disp: 414 mL, Rfl: 3    amoxicillin-clavulanate 875-125mg (AUGMENTIN) 875-125 mg per tablet, Take 1 tablet by mouth 2 (two) times daily., Disp: 10 tablet, Rfl: 0    fluticasone (FLONASE) 50 mcg/actuation nasal spray, 2 sprays by Each Nare route once daily., Disp: 1 Bottle, Rfl: 0     Review of Systems   Constitutional: Negative for chills, diaphoresis, fatigue and fever.   HENT: Positive for congestion, ear pain, postnasal drip, rhinorrhea, sinus pain, sinus pressure and sore throat. Negative for dental problem, ear discharge, facial swelling, hoarse voice, mouth sores, nosebleeds, sneezing, tinnitus, trouble swallowing and voice change.    Eyes: Negative for pain, discharge, redness and itching.   Respiratory: Positive for cough. Negative for chest tightness, shortness of breath and wheezing.   "  Cardiovascular: Negative for chest pain, palpitations and leg swelling.   Gastrointestinal: Positive for nausea. Negative for abdominal pain, diarrhea and vomiting.   Musculoskeletal: Negative for myalgias, neck pain and neck stiffness.   Skin: Negative for rash.   Neurological: Positive for headaches (Right formtal/maxillary).   Psychiatric/Behavioral: Positive for sleep disturbance.     Objective:   /80 (BP Location: Right arm, Patient Position: Sitting, BP Method: Large (Manual))   Pulse 102   Temp 97.1 °F (36.2 °C) (Tympanic)   Ht 5' 6" (1.676 m)   Wt 130.4 kg (287 lb 7.7 oz)   SpO2 98%   BMI 46.40 kg/m²     Physical Exam   Constitutional: Vital signs are normal. She appears well-developed and well-nourished. She does not appear ill.   HENT:   Right Ear: Hearing, tympanic membrane, external ear and ear canal normal.   Left Ear: Hearing, tympanic membrane, external ear and ear canal normal.   Nose: Mucosal edema and rhinorrhea present. Right sinus exhibits maxillary sinus tenderness and frontal sinus tenderness. Left sinus exhibits no maxillary sinus tenderness and no frontal sinus tenderness.   Mouth/Throat: Uvula is midline and mucous membranes are normal. Posterior oropharyngeal erythema present. No oropharyngeal exudate, posterior oropharyngeal edema or tonsillar abscesses. No tonsillar exudate.   Eyes: Conjunctivae are normal. Right conjunctiva is not injected. Left conjunctiva is not injected.   Neck: Normal range of motion. Neck supple.   Cardiovascular: Normal rate, regular rhythm and normal heart sounds.  Exam reveals no gallop and no friction rub.    No murmur heard.  Pulmonary/Chest: Effort normal and breath sounds normal. She has no wheezes. She has no rhonchi. She has no rales.   Abdominal: Soft. She exhibits no distension. There is no tenderness.   Lymphadenopathy:     She has no cervical adenopathy.   Skin: Skin is warm, dry and intact. No rash noted.   Psychiatric: She has a normal " mood and affect.   Nursing note and vitals reviewed.    Assessment:     1. Acute bacterial rhinosinusitis      Plan:   Acute bacterial rhinosinusitis  -     amoxicillin-clavulanate 875-125mg (AUGMENTIN) 875-125 mg per tablet; Take 1 tablet by mouth 2 (two) times daily.  Dispense: 10 tablet; Refill: 0  -     fluticasone (FLONASE) 50 mcg/actuation nasal spray; 2 sprays by Each Nare route once daily.  Dispense: 1 Bottle; Refill: 0    Increase Fluids   Rest  Head of Bed 45 degrees  Warm Compresses Over Sinuses Twice a Day  Take medicine as prescribed  Tylenol or Motrin for fever or aches  Should restart metformin, vitamin D, and cabergoline as previously prescribed by PCP and Specialist  RTC as needed

## 2017-12-04 ENCOUNTER — PATIENT OUTREACH (OUTPATIENT)
Dept: ADMINISTRATIVE | Facility: HOSPITAL | Age: 32
End: 2017-12-04

## 2018-01-09 ENCOUNTER — OFFICE VISIT (OUTPATIENT)
Dept: URGENT CARE | Facility: CLINIC | Age: 33
End: 2018-01-09
Payer: COMMERCIAL

## 2018-01-09 VITALS
HEART RATE: 113 BPM | SYSTOLIC BLOOD PRESSURE: 130 MMHG | HEIGHT: 67 IN | TEMPERATURE: 100 F | DIASTOLIC BLOOD PRESSURE: 90 MMHG | BODY MASS INDEX: 45.88 KG/M2 | WEIGHT: 292.31 LBS | OXYGEN SATURATION: 98 %

## 2018-01-09 DIAGNOSIS — R03.0 ELEVATED BLOOD PRESSURE READING: ICD-10-CM

## 2018-01-09 DIAGNOSIS — J11.1 INFLUENZA: Primary | ICD-10-CM

## 2018-01-09 PROCEDURE — 99214 OFFICE O/P EST MOD 30 MIN: CPT | Mod: S$GLB,,, | Performed by: PHYSICIAN ASSISTANT

## 2018-01-09 PROCEDURE — 3008F BODY MASS INDEX DOCD: CPT | Mod: S$GLB,,, | Performed by: PHYSICIAN ASSISTANT

## 2018-01-09 PROCEDURE — 99999 PR PBB SHADOW E&M-EST. PATIENT-LVL III: CPT | Mod: PBBFAC,,, | Performed by: PHYSICIAN ASSISTANT

## 2018-01-09 NOTE — PATIENT INSTRUCTIONS

## 2018-01-09 NOTE — LETTER
January 9, 2018      [facility]  [address] [place] [address]  Phone: [phone]  Fax: [phone]       Patient: Ivana Blair   YOB: 1985  Date of Visit: 01/09/2018    To Whom It May Concern:    Prieto Blair  was at [facility] on 01/09/2018. She may return to work/school on 1/11/18 with no restrictions. If you have any questions or concerns, or if I can be of further assistance, please do not hesitate to contact me.    Sincerely,    Felicita Pires PA-C

## 2018-01-09 NOTE — PROGRESS NOTES
"Subjective:      Patient ID: Ivana Blair is a 32 y.o. female.    Chief Complaint: Generalized Body Aches    Fever    This is a new problem. The current episode started in the past 7 days (3days). Associated symptoms include congestion, coughing (occasionally productive), headaches, nausea, a sore throat, vomiting (1 episode, yesterday) and wheezing (comes and goes). Pertinent negatives include no abdominal pain, diarrhea or ear pain. Treatments tried: tomasa-seltzer flu, Theraflu.     Review of Systems   Constitutional: Positive for chills, diaphoresis and fever.   HENT: Positive for congestion, rhinorrhea, sore throat and voice change (hoarseness). Negative for ear pain.    Eyes: Negative for visual disturbance.   Respiratory: Positive for cough (occasionally productive) and wheezing (comes and goes). Negative for shortness of breath.    Gastrointestinal: Positive for nausea and vomiting (1 episode, yesterday). Negative for abdominal pain and diarrhea.   Musculoskeletal:        (+) body aches   Neurological: Positive for light-headedness (mild) and headaches. Negative for dizziness and speech difficulty.   Psychiatric/Behavioral: Negative for confusion.       Objective:   BP (!) 164/100 (BP Location: Left arm, Patient Position: Sitting, BP Method: Medium (Automatic))   Pulse (!) 113   Temp 100.3 °F (37.9 °C) (Tympanic)   Ht 5' 6.5" (1.689 m)   Wt 132.6 kg (292 lb 5.3 oz)   SpO2 98%   BMI 46.48 kg/m²   Physical Exam   Constitutional: She appears well-developed and well-nourished. She does not appear ill. No distress.   HENT:   Head: Normocephalic and atraumatic.   Right Ear: Tympanic membrane and ear canal normal. Tympanic membrane is not erythematous. No middle ear effusion.   Left Ear: Tympanic membrane and ear canal normal. Tympanic membrane is not erythematous.  No middle ear effusion.   Nose: Mucosal edema and rhinorrhea present. Right sinus exhibits frontal sinus tenderness. Right sinus exhibits no " "maxillary sinus tenderness. Left sinus exhibits frontal sinus tenderness. Left sinus exhibits no maxillary sinus tenderness.   Mouth/Throat: Uvula is midline and oropharynx is clear and moist.   Cardiovascular: Normal rate, regular rhythm and normal heart sounds.    No murmur heard.  Pulmonary/Chest: Effort normal and breath sounds normal. No respiratory distress. She has no decreased breath sounds. She has no wheezes. She has no rhonchi. She has no rales.   Lymphadenopathy:     She has no cervical adenopathy.   Skin: Skin is warm and dry. No rash noted. She is not diaphoretic.   Psychiatric: She has a normal mood and affect. Her speech is normal and behavior is normal. Thought content normal.     Assessment:      1. Influenza    2. Elevated blood pressure reading       Plan:   Influenza    Elevated blood pressure reading    Discussed elevated blood pressure with patient (she states she took otc meds) and advised of the following:  Get a blood pressure monitor from your local pharmacy and check your blood pressure every morning.   Follow a diet low in SODIUM.  This includes all fast food, canned foods, processed foods, and foods with preservatives such as cheese.  Eliminate caffeine and other "energy drinks".  Drink at least 64 ounces of water a day.  Exercise for at least 30 minutes 5 days per week.    Schedule a follow up appointment to re-check your blood pressure.  Bring your blood pressure diary with you to this appointment.    Discussed that Tamiflu likely to be ineffective as she is currently on day 3 of illness.  Recommend continue otc symptomatic treatment with increased rest and hydration.     Gave handout on influenza.  Printed AVS and reviewed treatment plan in detail.    Discussed worsening signs/symptoms and when to return to clinic or go to ED.   Patient expresses understanding and agrees with treatment plan.   "

## 2018-03-23 ENCOUNTER — TELEPHONE (OUTPATIENT)
Dept: OBSTETRICS AND GYNECOLOGY | Facility: CLINIC | Age: 33
End: 2018-03-23

## 2018-03-23 NOTE — TELEPHONE ENCOUNTER
----- Message from Sara Clifton sent at 3/23/2018 10:39 AM CDT -----  Contact: self [phone]  States that she would like to be worked in for an appt with Dr Thompson for an annual exam. Please call back at [phone]//thank you acc

## 2018-03-29 ENCOUNTER — TELEPHONE (OUTPATIENT)
Dept: OBSTETRICS AND GYNECOLOGY | Facility: CLINIC | Age: 33
End: 2018-03-29

## 2018-03-29 ENCOUNTER — OFFICE VISIT (OUTPATIENT)
Dept: OBSTETRICS AND GYNECOLOGY | Facility: CLINIC | Age: 33
End: 2018-03-29
Payer: COMMERCIAL

## 2018-03-29 VITALS
SYSTOLIC BLOOD PRESSURE: 146 MMHG | BODY MASS INDEX: 45.99 KG/M2 | DIASTOLIC BLOOD PRESSURE: 95 MMHG | WEIGHT: 293 LBS | HEIGHT: 67 IN

## 2018-03-29 DIAGNOSIS — Z01.419 WELL WOMAN EXAM WITH ROUTINE GYNECOLOGICAL EXAM: ICD-10-CM

## 2018-03-29 DIAGNOSIS — N91.2 AMENORRHEA: ICD-10-CM

## 2018-03-29 DIAGNOSIS — Z86.018 HISTORY OF PROLACTINOMA: Primary | ICD-10-CM

## 2018-03-29 PROCEDURE — 99999 PR PBB SHADOW E&M-EST. PATIENT-LVL III: CPT | Mod: PBBFAC,,, | Performed by: OBSTETRICS & GYNECOLOGY

## 2018-03-29 PROCEDURE — 99395 PREV VISIT EST AGE 18-39: CPT | Mod: S$GLB,,, | Performed by: OBSTETRICS & GYNECOLOGY

## 2018-03-29 PROCEDURE — 88175 CYTOPATH C/V AUTO FLUID REDO: CPT

## 2018-03-29 RX ORDER — MEDROXYPROGESTERONE ACETATE 10 MG/1
10 TABLET ORAL DAILY
Qty: 14 TABLET | Refills: 1 | Status: SHIPPED | OUTPATIENT
Start: 2018-03-29 | End: 2019-04-30

## 2018-03-29 NOTE — PROGRESS NOTES
CHIEF COMPLAINT:   Gynecologic Exam  Chief Complaint   Patient presents with    Well Woman       HISTORY OF PRESENT ILLNESS  Patient presents for annual exam. The patient has no complaints today. No menses since last visit.   No h/a vision changes or nipple d/c.   Would like to have endocrine f/u here at [facility] for her hx prolactinoma.  She is not sexually active, so not on birth control.     She is here today for her annual f/u.           From last encounter:   Ivanaunruly Sweeney Rossy 32 y.o. G0 complains of: low libido, wt gain, rare periods  She also never had a pap smear as she says pelvic exams and intercourse (which she had just once) are too painful.   Menarche at 20yo, and menses then were q2mo, then stretched out to less frequent, then completely stopped at around age 22. Around then she had been given a provera trial  And bled after this. Women in her family typically have menarche in their late teens.   Saw  Endocrinologists in past, seeing Mustapha now.   She had an expanding golf balled sized prolactinoma which was resected by Luz 2015 but not all was removed so needs to take twice weekly cabergoline however this is intermittent as it makes her very sleepy.   Nenana once only 2006. Slightly painful but not excruciating, and also no sensation or pleasure, so did not want it again. No libido.   No abuse as a child.   [employer]'s worker on [employer]. She is also a dietician.           Health Maintenance   Topic Date Due    Pap Smear with HPV Cotest  02/07/2005    Influenza Vaccine  08/01/2017    TETANUS VACCINE  08/01/2023    Lipid Panel  Completed       HISTORY  Patient Active Problem List   Diagnosis    Benign prolactinoma    Non-pregnancy related A-G syndrome    Morbid obesity    Vitamin D insufficiency       Past Medical History:   Diagnosis Date    Allergy     sinus    Anxiety     B12 deficiency     Benign prolactinoma     s/p surgery    Depression     Hx of varicella     Vitamin D  deficiency        Past Surgical History:   Procedure Laterality Date    ADENOIDECTOMY      2007    NASAL JOSE MIGUEL BULLOSA RESECTION      2013    TONSILLECTOMY      2007    TUMOR REMOVAL  2/25/2015    Prolactinoma       Family History   Problem Relation Age of Onset    Anemia Mother     Gout Father     Cancer Maternal Grandfather      prostate    Cataracts Maternal Grandfather     Rheumatologic disease Paternal Grandmother     Cancer Paternal Grandfather      lung    Anemia Sister     Heart disease Maternal Aunt     Alzheimer's disease Maternal Grandmother        Social History     Social History    Marital status: Single     Spouse name: N/A    Number of children: 0    Years of education: N/A     Occupational History    [employer]     Social History Main Topics    Smoking status: Never Smoker    Smokeless tobacco: Never Used    Alcohol use No    Drug use: No    Sexual activity: Not Currently     Partners: Male     Birth control/ protection: None     Other Topics Concern    Are You Pregnant Or Think You May Be? No    Breast-Feeding No     Social History Narrative    None       No current outpatient prescriptions on file.     No current facility-administered medications for this visit.        Review of patient's allergies indicates:   Allergen Reactions    Pollen,fermented Itching           PHYSICAL EXAM     Vitals:    03/29/18 0930   BP: (!) 146/95   says she is anxious    PAIN SCALE: 0/10 None    ROS:  GENERAL: No fever, chills, fatigability or weight loss.  ABDOMEN: Appetite fine. No weight loss. Denies diarrhea, abdominal pain, hematemesis or blood in stool.  No change in bowel movement pattern.  URINARY: No flank pain, dysuria or hematuria.  REPRODUCTIVE: No abnormal vaginal bleeding.  BREASTS: Breasts symmetric, nontender and no lumps detected.    PE:   APPEARANCE: Well nourished, well developed, in no acute distress.  NECK: Neck symmetric without masses or thyromegaly.     NODES: No inguinal lymph node enlargement.  ABDOMEN: Soft. No tenderness or masses. No hepatosplenomegaly. No hernias.  BREASTS: Symmetrical, no skin changes or visible lesions. No palpable masses, nipple discharge or adenopathy bilaterally.    PELVIC:   VULVA: No lesions. Normal female genitalia.  URETHRAL MEATUS: Normal size and location, no lesions, no prolapse.  URETHRA: No masses, tenderness, prolapse or scarring.  VAGINA: Moist and well rugated, no discharge, no significant cystocele or rectocele.  CERVIX: No lesions, normal diameter, no stenosis, no cervical motion tenderness.  UTERUS: 6 week size, regular shape, mobile, non-tender, normal position, good support.  ADNEXA: No masses, tenderness or CDS nodularity.  ANUS PERINEUM: No lesions, no relaxation, no external hemorrhoids.      DIAGNOSIS:   1. Normal gyn exam  2. Screening pap smear  3. Morbid obesity  4. Amenorrhea  5. Hx prolactinoma (resected)    PLAN:   Provera challenge for 2wks     MEDICATIONS PRESCRIBED:   PNV      COUNSELING:  Patient was counseled today on A.C.S. Pap guidelines and recommendations for yearly pelvic exams, mammograms and monthly self breast exams; to see her PCP for other health maintenance.   Patient was counseled on above diagnoses (with over 50% of the time spent on education) during her 25   minute visit.       FOLLOW-UP: With me in 1 mo. Will cont w/u if there is no menses after provera.

## 2018-03-29 NOTE — TELEPHONE ENCOUNTER
----- Message from Sandra Jj sent at 3/29/2018  8:51 AM CDT -----  Contact: pt  She's calling to advise that she is late for her appointment due to her lights going out, wanted to know if she can still come today, please advise [phone] (home)

## 2018-06-06 ENCOUNTER — PATIENT MESSAGE (OUTPATIENT)
Dept: OBSTETRICS AND GYNECOLOGY | Facility: CLINIC | Age: 33
End: 2018-06-06

## 2018-07-10 ENCOUNTER — PATIENT MESSAGE (OUTPATIENT)
Dept: OBSTETRICS AND GYNECOLOGY | Facility: CLINIC | Age: 33
End: 2018-07-10

## 2018-07-10 ENCOUNTER — TELEPHONE (OUTPATIENT)
Dept: OBSTETRICS AND GYNECOLOGY | Facility: CLINIC | Age: 33
End: 2018-07-10

## 2018-07-10 NOTE — TELEPHONE ENCOUNTER
Attempted to contact patient. No answer, unable to leave voice mail. Will try again later.    Sent portal message.

## 2018-07-16 ENCOUNTER — PATIENT MESSAGE (OUTPATIENT)
Dept: OBSTETRICS AND GYNECOLOGY | Facility: CLINIC | Age: 33
End: 2018-07-16

## 2018-07-16 ENCOUNTER — TELEPHONE (OUTPATIENT)
Dept: OBSTETRICS AND GYNECOLOGY | Facility: CLINIC | Age: 33
End: 2018-07-16

## 2018-07-16 NOTE — TELEPHONE ENCOUNTER
----- Message from Niecy Loja sent at 7/16/2018  7:28 AM CDT -----  Contact: [t   States she's calling to resched her appt for a follow up/ Appt needs resched per notes and nothing is coming up for the Dr's schedule and can be reached at [phone]//thanks/dbw

## 2018-08-02 ENCOUNTER — INITIAL CONSULT (OUTPATIENT)
Dept: ENDOCRINOLOGY | Facility: CLINIC | Age: 33
End: 2018-08-02
Payer: COMMERCIAL

## 2018-08-02 ENCOUNTER — LAB VISIT (OUTPATIENT)
Dept: LAB | Facility: HOSPITAL | Age: 33
End: 2018-08-02
Attending: INTERNAL MEDICINE
Payer: COMMERCIAL

## 2018-08-02 VITALS
TEMPERATURE: 98 F | DIASTOLIC BLOOD PRESSURE: 74 MMHG | HEIGHT: 67 IN | SYSTOLIC BLOOD PRESSURE: 102 MMHG | WEIGHT: 288.38 LBS | BODY MASS INDEX: 45.26 KG/M2 | HEART RATE: 71 BPM

## 2018-08-02 DIAGNOSIS — D49.7 PITUITARY TUMOR: ICD-10-CM

## 2018-08-02 DIAGNOSIS — E22.1 HYPERPROLACTINEMIA: ICD-10-CM

## 2018-08-02 DIAGNOSIS — D49.7 PITUITARY TUMOR: Primary | ICD-10-CM

## 2018-08-02 DIAGNOSIS — E88.819 INSULIN RESISTANCE: ICD-10-CM

## 2018-08-02 LAB
ALBUMIN SERPL BCP-MCNC: 3.8 G/DL
ALP SERPL-CCNC: 56 U/L
ALT SERPL W/O P-5'-P-CCNC: 21 U/L
ANION GAP SERPL CALC-SCNC: 8 MMOL/L
AST SERPL-CCNC: 28 U/L
BILIRUB SERPL-MCNC: 0.4 MG/DL
BUN SERPL-MCNC: 9 MG/DL
CALCIUM SERPL-MCNC: 9.8 MG/DL
CHLORIDE SERPL-SCNC: 105 MMOL/L
CHOLEST SERPL-MCNC: 141 MG/DL
CHOLEST/HDLC SERPL: 3.1 {RATIO}
CO2 SERPL-SCNC: 26 MMOL/L
CREAT SERPL-MCNC: 1 MG/DL
EST. GFR  (AFRICAN AMERICAN): >60 ML/MIN/1.73 M^2
EST. GFR  (NON AFRICAN AMERICAN): >60 ML/MIN/1.73 M^2
ESTIMATED AVG GLUCOSE: 117 MG/DL
ESTRADIOL SERPL-MCNC: 35 PG/ML
FSH SERPL-ACNC: 0.3 MIU/ML
GLUCOSE SERPL-MCNC: 86 MG/DL
HBA1C MFR BLD HPLC: 5.7 %
HDLC SERPL-MCNC: 45 MG/DL
HDLC SERPL: 31.9 %
INSULIN COLLECTION INTERVAL: NORMAL
INSULIN SERPL-ACNC: 9.6 UU/ML
LDLC SERPL CALC-MCNC: 81 MG/DL
LH SERPL-ACNC: 0.1 MIU/ML
NONHDLC SERPL-MCNC: 96 MG/DL
POTASSIUM SERPL-SCNC: 4 MMOL/L
PROLACTIN SERPL IA-MCNC: 377.8 NG/ML
PROT SERPL-MCNC: 7.4 G/DL
SODIUM SERPL-SCNC: 139 MMOL/L
T3FREE SERPL-MCNC: 2.1 PG/ML
T4 FREE SERPL-MCNC: 0.89 NG/DL
TRIGL SERPL-MCNC: 75 MG/DL
TSH SERPL DL<=0.005 MIU/L-ACNC: 1.34 UIU/ML

## 2018-08-02 PROCEDURE — 84305 ASSAY OF SOMATOMEDIN: CPT

## 2018-08-02 PROCEDURE — 36415 COLL VENOUS BLD VENIPUNCTURE: CPT | Mod: PO

## 2018-08-02 PROCEDURE — 80053 COMPREHEN METABOLIC PANEL: CPT

## 2018-08-02 PROCEDURE — 99999 PR PBB SHADOW E&M-EST. PATIENT-LVL III: CPT | Mod: PBBFAC,,, | Performed by: INTERNAL MEDICINE

## 2018-08-02 PROCEDURE — 84481 FREE ASSAY (FT-3): CPT

## 2018-08-02 PROCEDURE — 82670 ASSAY OF TOTAL ESTRADIOL: CPT

## 2018-08-02 PROCEDURE — 83001 ASSAY OF GONADOTROPIN (FSH): CPT

## 2018-08-02 PROCEDURE — 84146 ASSAY OF PROLACTIN: CPT

## 2018-08-02 PROCEDURE — 80061 LIPID PANEL: CPT

## 2018-08-02 PROCEDURE — 83520 IMMUNOASSAY QUANT NOS NONAB: CPT

## 2018-08-02 PROCEDURE — 83036 HEMOGLOBIN GLYCOSYLATED A1C: CPT

## 2018-08-02 PROCEDURE — 82024 ASSAY OF ACTH: CPT

## 2018-08-02 PROCEDURE — 83003 ASSAY GROWTH HORMONE (HGH): CPT

## 2018-08-02 PROCEDURE — 83002 ASSAY OF GONADOTROPIN (LH): CPT

## 2018-08-02 PROCEDURE — 83525 ASSAY OF INSULIN: CPT

## 2018-08-02 PROCEDURE — 84439 ASSAY OF FREE THYROXINE: CPT

## 2018-08-02 PROCEDURE — 99244 OFF/OP CNSLTJ NEW/EST MOD 40: CPT | Mod: S$GLB,,, | Performed by: INTERNAL MEDICINE

## 2018-08-02 PROCEDURE — 84443 ASSAY THYROID STIM HORMONE: CPT

## 2018-08-02 RX ORDER — CABERGOLINE 0.5 MG/1
0.25 TABLET ORAL
COMMUNITY
End: 2018-08-02 | Stop reason: SDUPTHER

## 2018-08-02 RX ORDER — CABERGOLINE 0.5 MG/1
0.25 TABLET ORAL
Qty: 12 TABLET | Refills: 11 | Status: SHIPPED | OUTPATIENT
Start: 2018-08-02 | End: 2018-08-20 | Stop reason: SDUPTHER

## 2018-08-02 NOTE — PROGRESS NOTES
""This note will be shared with the patient"Subjective:       Patient ID: Ivana Blair is a 33 y.o. female.  Patient is new to me    Records were reviewed      Chief Complaint: Hyperprolactinemia      Consultation requested by Dr. Steph Thompson    HPI  Patient with long history of hyperprolactinemia and history of pituitary macroadenoma.  She has seen multiple endocrinologists in the past according to records and the last time she saw an endocrinologist was Dr. Perla Carlos in 2015.  She was diagnosed according to records around 2001 with hyperprolactinemia and at that time the lesion might have been measured 1.3 cm but unfortunately she could not tolerate bromocriptine nor cabergoline.  She states bromocriptine caused strange dreams as well as other side effects and the cabergoline causes significant drowsiness that affects her ability to work but she does realize that her prolactin levels have greatly increased and she is willing to try this 1 again    She did have a transsphenoidal pituitary resection performed by Dr. Wayne-February 27, 2015 as there is an MRI that was done the next day after surgery under care everywhere and shows postoperative changes.  Unfortunately her prolactin level remain elevated after surgery and gradually increased and she essentially has had amenorrhea for many years  LMP 2002  Given provera 2006 and recently given by her gynecologist    Lately also having mild galactorrhea with white nipple discharge mostly when she presses her breast or she may see it on her clothing    She has always had some feeling of sinus type headache or pressure but nothing severe and she denies any blurry vision or double vision and no hair loss or facial hair.  She was told she was insulin resistant but she has recently changed her diet and lost about 10 lb    She states she had darkening of her skin consistent with acanthosis along her nasal labial folds and in her neck area that has lighten " since she has lost weight and improve her diet    Patient reports that when she did take a visual field exam in the past prior to her surgery it was normal  I have reviewed the past medical, family and social history    Review of Systems   Constitutional: Negative for appetite change, fatigue, fever and unexpected weight change.   HENT: Negative for sore throat and trouble swallowing.    Eyes: Negative for visual disturbance.   Respiratory: Negative for shortness of breath and wheezing.    Cardiovascular: Negative for chest pain, palpitations and leg swelling.   Gastrointestinal: Negative for diarrhea, nausea and vomiting.   Endocrine: Negative for cold intolerance, heat intolerance, polydipsia, polyphagia and polyuria.   Genitourinary: Positive for menstrual problem. Negative for difficulty urinating and dysuria.   Musculoskeletal: Negative for arthralgias and joint swelling.   Skin: Negative for rash.   Neurological: Negative for dizziness, weakness, numbness and headaches.   Psychiatric/Behavioral: Negative for confusion, dysphoric mood and sleep disturbance.       Objective:      Physical Exam   Constitutional: She is oriented to person, place, and time. No distress.   Pleasant female with generalized obesity, does not have acromegalic or cushingoid features in particular   HENT:   Head: Normocephalic and atraumatic.   Right Ear: External ear normal.   Left Ear: External ear normal.   Nose: Nose normal.   Mouth/Throat: Oropharynx is clear and moist. No oropharyngeal exudate.   Eyes: Conjunctivae and EOM are normal. Pupils are equal, round, and reactive to light. No scleral icterus.   Neck: No JVD present. No tracheal deviation present. No thyromegaly present.   Cardiovascular: Normal rate, regular rhythm, normal heart sounds and intact distal pulses.  Exam reveals no gallop and no friction rub.    No murmur heard.  Pulmonary/Chest: Effort normal and breath sounds normal. No respiratory distress. She has no  wheezes. She has no rales.   Abdominal: Soft. Bowel sounds are normal. She exhibits no distension and no mass. There is no tenderness. There is no rebound and no guarding. No hernia.   Musculoskeletal: She exhibits no edema or deformity.   Lymphadenopathy:     She has no cervical adenopathy.   Neurological: She is alert and oriented to person, place, and time. She has normal reflexes. No cranial nerve deficit.   Skin: Skin is warm. No rash noted. She is not diaphoretic. No erythema.   She has hyperpigmentation in her posterior neck area consistent with acanthosis   Psychiatric: She has a normal mood and affect. Her behavior is normal.   Vitals reviewed.        Lab Review:   No visits with results within 6 Month(s) from this visit.   Latest known visit with results is:   Lab Visit on 05/10/2017   Component Date Value    WBC 05/10/2017 8.16     RBC 05/10/2017 4.60     Hemoglobin 05/10/2017 12.0     Hematocrit 05/10/2017 39.2     MCV 05/10/2017 85     MCH 05/10/2017 26.1*    MCHC 05/10/2017 30.6*    RDW 05/10/2017 14.6*    Platelets 05/10/2017 311     MPV 05/10/2017 11.5     Gran # (ANC) 05/10/2017 4.3     Lymph # 05/10/2017 2.9     Mono # 05/10/2017 0.7     Eos # 05/10/2017 0.1     Baso # 05/10/2017 0.02     Gran% 05/10/2017 53.2     Lymph% 05/10/2017 36.0     Mono% 05/10/2017 8.8     Eosinophil% 05/10/2017 1.6     Basophil% 05/10/2017 0.2     Differential Method 05/10/2017 Automated     Sodium 05/10/2017 140     Potassium 05/10/2017 4.4     Chloride 05/10/2017 105     CO2 05/10/2017 26     Glucose 05/10/2017 96     BUN, Bld 05/10/2017 11     Creatinine 05/10/2017 1.0     Calcium 05/10/2017 9.6     Total Protein 05/10/2017 7.2     Albumin 05/10/2017 3.6     Total Bilirubin 05/10/2017 0.2     Alkaline Phosphatase 05/10/2017 52*    AST 05/10/2017 18     ALT 05/10/2017 16     Anion Gap 05/10/2017 9     eGFR if African American 05/10/2017 >60.0     eGFR if non African Amer* 05/10/2017  >60.0     TSH 05/10/2017 1.211     Hemoglobin A1C 05/10/2017 6.2     Estimated Avg Glucose 05/10/2017 131     Insulin 05/10/2017 22.0     Insulin Collection Inter* 05/10/2017 random     Prolactin 05/10/2017 284.0*    Vit D, 25-Hydroxy 05/10/2017 11*       Assessment:     1. Pituitary tumor  ACTH    Alpha-Subunit Pituitary Tumor Marker    Insulin-like growth factor    Prolactin    T3, free    T4, free    TSH    Growth hormone    Follicle stimulating hormone    Estradiol    Luteinizing hormone    MRI Brain W WO Contrast    Creatinine, urine, timed 24 Hours    Cortisol, urine, free    Comprehensive metabolic panel    Hemoglobin A1c    Lipid panel    INSULIN, RANDOM   2. Hyperprolactinemia  ACTH    Alpha-Subunit Pituitary Tumor Marker    Insulin-like growth factor    Prolactin    T3, free    T4, free    TSH    Growth hormone    Follicle stimulating hormone    Estradiol    Luteinizing hormone    MRI Brain W WO Contrast    Creatinine, urine, timed 24 Hours    Cortisol, urine, free    Comprehensive metabolic panel    Hemoglobin A1c    Lipid panel    INSULIN, RANDOM   3. Insulin resistance  Comprehensive metabolic panel    Hemoglobin A1c    Lipid panel    INSULIN, RANDOM    patient with past history of  trans-sphenoidal pituitary resection of pituitary macroadenoma, prolactinoma with significant hyperprolactinemia and has side effects on both bromocriptine and Dostinex but she is willing to try going back on Dostinex so she will try half a tablet twice a week on the days when she is not working and I will check her hypothalamic pituitary hormonal axis and also because of her insulin resistance check her A1c, insulin level and lipid panel and I will obtain an updated MRI as I am concerned that she has a regrowth of a macroadenoma and if so she will need visual field testing as well.  Because this is a reoccurring tumor she likely will need surgery and possibly adjuvant treatment with radiation or gamma knife therapy  perhaps    I explained to patient the extreme importance of keeping her follow-up appointments and also discussed complications of pituitary tumors especially macro adenomas including hemorrhage and cautioned her to seek emergency treatment for severe symptoms such as severe headache or vision loss  Plan:   Pituitary tumor  -     ACTH; Future; Expected date: 08/02/2018  -     Alpha-Subunit Pituitary Tumor Marker; Future; Expected date: 08/02/2018  -     Insulin-like growth factor; Future; Expected date: 08/02/2018  -     Prolactin; Future; Expected date: 08/02/2018  -     T3, free; Future; Expected date: 08/02/2018  -     T4, free; Future; Expected date: 08/02/2018  -     TSH; Future; Expected date: 08/02/2018  -     Growth hormone; Future; Expected date: 08/02/2018  -     Follicle stimulating hormone; Future; Expected date: 08/02/2018  -     Estradiol; Future; Expected date: 08/02/2018  -     Luteinizing hormone; Future; Expected date: 08/02/2018  -     MRI Brain W WO Contrast; Future; Expected date: 08/02/2018  -     Creatinine, urine, timed 24 Hours; Future  -     Cortisol, urine, free; Future  -     Comprehensive metabolic panel; Future; Expected date: 08/02/2018  -     Hemoglobin A1c; Future; Expected date: 08/02/2018  -     Lipid panel; Future; Expected date: 08/02/2018  -     INSULIN, RANDOM; Future; Expected date: 08/02/2018    Hyperprolactinemia  -     ACTH; Future; Expected date: 08/02/2018  -     Alpha-Subunit Pituitary Tumor Marker; Future; Expected date: 08/02/2018  -     Insulin-like growth factor; Future; Expected date: 08/02/2018  -     Prolactin; Future; Expected date: 08/02/2018  -     T3, free; Future; Expected date: 08/02/2018  -     T4, free; Future; Expected date: 08/02/2018  -     TSH; Future; Expected date: 08/02/2018  -     Growth hormone; Future; Expected date: 08/02/2018  -     Follicle stimulating hormone; Future; Expected date: 08/02/2018  -     Estradiol; Future; Expected date:  08/02/2018  -     Luteinizing hormone; Future; Expected date: 08/02/2018  -     MRI Brain W WO Contrast; Future; Expected date: 08/02/2018  -     Creatinine, urine, timed 24 Hours; Future  -     Cortisol, urine, free; Future  -     Comprehensive metabolic panel; Future; Expected date: 08/02/2018  -     Hemoglobin A1c; Future; Expected date: 08/02/2018  -     Lipid panel; Future; Expected date: 08/02/2018  -     INSULIN, RANDOM; Future; Expected date: 08/02/2018    Insulin resistance  -     Comprehensive metabolic panel; Future; Expected date: 08/02/2018  -     Hemoglobin A1c; Future; Expected date: 08/02/2018  -     Lipid panel; Future; Expected date: 08/02/2018  -     INSULIN, RANDOM; Future; Expected date: 08/02/2018          No Follow-up on file.

## 2018-08-02 NOTE — LETTER
August 2, 2018      Steph Thompson MD  [address]  [place] [address]           [facility] - Endocrinology  [address]  [address]  [place] [address]  Phone: [phone]  Fax: [phone]          Patient: Ivana Blair   MR Number: [id number]   YOB: 1985   Date of Visit: 8/2/2018       Dear Dr. Steph Thompson:    Thank you for referring Ivana Blair to me for evaluation. Attached you will find relevant portions of my assessment and plan of care.    If you have questions, please do not hesitate to call me. I look forward to following Ivana Blair along with you.    Sincerely,    Lorrie Toribio MD    Enclosure  CC:  No Recipients    If you would like to receive this communication electronically, please contact [email or web address] or [phone] to request more information on [employer] Link access.    For providers and/or their staff who would like to refer a patient to [facility], please contact us through our one-stop-shop provider referral line, [facility], at [phone].    If you feel you have received this communication in error or would no longer like to receive these types of communications, please e-mail [email or web address]

## 2018-08-03 LAB
ACTH PLAS-MCNC: 24 PG/ML
GH SERPL-MCNC: 0.9 NG/ML

## 2018-08-06 ENCOUNTER — LAB VISIT (OUTPATIENT)
Dept: LAB | Facility: HOSPITAL | Age: 33
End: 2018-08-06
Attending: INTERNAL MEDICINE
Payer: COMMERCIAL

## 2018-08-06 DIAGNOSIS — E22.1 HYPERPROLACTINEMIA: ICD-10-CM

## 2018-08-06 DIAGNOSIS — D49.7 PITUITARY TUMOR: ICD-10-CM

## 2018-08-06 LAB
CREAT 24H UR-MRATE: 82.5 MG/HR
CREAT UR-MCNC: 220 MG/DL
CREATININE, URINE (MG/SPEC): 1980 MG/SPEC
IGF-I SERPL-MCNC: 137 NG/ML (ref 59–279)
IGF-I Z-SCORE SERPL: -0.13 SD
URINE COLLECTION DURATION: 24 HR
URINE VOLUME: 900 ML

## 2018-08-06 PROCEDURE — 82570 ASSAY OF URINE CREATININE: CPT

## 2018-08-06 PROCEDURE — 82530 CORTISOL FREE: CPT

## 2018-08-07 ENCOUNTER — PATIENT MESSAGE (OUTPATIENT)
Dept: ENDOCRINOLOGY | Facility: CLINIC | Age: 33
End: 2018-08-07

## 2018-08-07 ENCOUNTER — TELEPHONE (OUTPATIENT)
Dept: RADIOLOGY | Facility: HOSPITAL | Age: 33
End: 2018-08-07

## 2018-08-07 ENCOUNTER — TELEPHONE (OUTPATIENT)
Dept: ENDOCRINOLOGY | Facility: CLINIC | Age: 33
End: 2018-08-07

## 2018-08-08 LAB — A-PGH SER-MCNC: 0.2 NG/ML

## 2018-08-09 LAB
COLLECT DURATION TIME UR: 24 H
CORTIS 24H UR-MRATE: 13 MCG/24 H (ref 3.5–45)
SPECIMEN VOL ?TM UR: 900 ML

## 2018-08-17 ENCOUNTER — PATIENT MESSAGE (OUTPATIENT)
Dept: ENDOCRINOLOGY | Facility: CLINIC | Age: 33
End: 2018-08-17

## 2018-08-20 ENCOUNTER — PATIENT MESSAGE (OUTPATIENT)
Dept: ENDOCRINOLOGY | Facility: CLINIC | Age: 33
End: 2018-08-20

## 2018-08-20 RX ORDER — CABERGOLINE 0.5 MG/1
TABLET ORAL
Qty: 8 TABLET | Refills: 11 | Status: SHIPPED | OUTPATIENT
Start: 2018-08-20 | End: 2020-08-10

## 2018-08-20 NOTE — TELEPHONE ENCOUNTER
"This message was not sent to me and I am just now seeing it regarding why the patient never got her MRI and it is very urgent.  I was under the impression that she was approved for this MRI so I do not know why she was told she had to pay the entire cost so please call the finance center and find out more information so that she can know what her true cost will be.  And apparently based on this message she may not have even started on the pill.    "The Financial Department called the day before the MRI stating that insurance refused payment and that it was solely my responsibility"    "

## 2018-08-23 ENCOUNTER — TELEPHONE (OUTPATIENT)
Dept: RADIOLOGY | Facility: HOSPITAL | Age: 33
End: 2018-08-23

## 2018-09-24 ENCOUNTER — TELEPHONE (OUTPATIENT)
Dept: OBSTETRICS AND GYNECOLOGY | Facility: CLINIC | Age: 33
End: 2018-09-24

## 2018-09-24 NOTE — TELEPHONE ENCOUNTER
----- Message from Luis Enrique Carlos MA sent at 9/24/2018 10:15 AM CDT -----  Contact: Pt  Pt called and would  like to schedule a appt regarding a f/u.      Pt can be reached at [phone].      Thanks

## 2018-09-24 NOTE — TELEPHONE ENCOUNTER
Spoke with pt. Scheduled pt a f/u with Dr. Thompson for 10/15/18 at 2:00 at the [facility] location. Spoke with Reta from radiology and scheduled MRI for 10/3/18 at 8:00AM at the [facility] location. Pt verbalized understanding.

## 2018-10-08 ENCOUNTER — HOSPITAL ENCOUNTER (OUTPATIENT)
Dept: RADIOLOGY | Facility: HOSPITAL | Age: 33
Discharge: HOME | End: 2018-10-08
Attending: INTERNAL MEDICINE
Payer: COMMERCIAL

## 2018-10-08 DIAGNOSIS — E22.1 HYPERPROLACTINEMIA: ICD-10-CM

## 2018-10-08 DIAGNOSIS — D49.7 PITUITARY TUMOR: ICD-10-CM

## 2018-10-08 PROCEDURE — A9585 GADOBUTROL INJECTION: HCPCS | Mod: PO | Performed by: INTERNAL MEDICINE

## 2018-10-08 PROCEDURE — 70553 MRI BRAIN STEM W/O & W/DYE: CPT | Mod: TC,PO

## 2018-10-08 PROCEDURE — 25500020 PHARM REV CODE 255: Mod: PO | Performed by: INTERNAL MEDICINE

## 2018-10-08 PROCEDURE — 70553 MRI BRAIN STEM W/O & W/DYE: CPT | Mod: 26,,, | Performed by: RADIOLOGY

## 2018-10-08 RX ORDER — GADOBUTROL 604.72 MG/ML
10 INJECTION INTRAVENOUS
Status: COMPLETED | OUTPATIENT
Start: 2018-10-08 | End: 2018-10-08

## 2018-10-08 RX ADMIN — GADOBUTROL 10 ML: 604.72 INJECTION INTRAVENOUS at 08:10

## 2018-10-09 ENCOUNTER — OFFICE VISIT (OUTPATIENT)
Dept: ENDOCRINOLOGY | Facility: CLINIC | Age: 33
End: 2018-10-09
Payer: COMMERCIAL

## 2018-10-09 VITALS
HEART RATE: 67 BPM | TEMPERATURE: 97 F | WEIGHT: 289.69 LBS | BODY MASS INDEX: 46.56 KG/M2 | HEIGHT: 66 IN | SYSTOLIC BLOOD PRESSURE: 108 MMHG | DIASTOLIC BLOOD PRESSURE: 80 MMHG

## 2018-10-09 DIAGNOSIS — E22.1 HYPERPROLACTINEMIA: ICD-10-CM

## 2018-10-09 DIAGNOSIS — E88.819 INSULIN RESISTANCE: ICD-10-CM

## 2018-10-09 DIAGNOSIS — D49.7 PITUITARY TUMOR: Primary | ICD-10-CM

## 2018-10-09 PROCEDURE — 99214 OFFICE O/P EST MOD 30 MIN: CPT | Mod: S$GLB,,, | Performed by: INTERNAL MEDICINE

## 2018-10-09 PROCEDURE — 99999 PR PBB SHADOW E&M-EST. PATIENT-LVL III: CPT | Mod: PBBFAC,,, | Performed by: INTERNAL MEDICINE

## 2018-10-09 PROCEDURE — 3008F BODY MASS INDEX DOCD: CPT | Mod: CPTII,S$GLB,, | Performed by: INTERNAL MEDICINE

## 2018-10-09 RX ORDER — METFORMIN HYDROCHLORIDE 500 MG/1
TABLET, EXTENDED RELEASE ORAL
Qty: 60 TABLET | Refills: 3 | Status: SHIPPED | OUTPATIENT
Start: 2018-10-09 | End: 2019-04-30 | Stop reason: SDUPTHER

## 2018-10-09 NOTE — PROGRESS NOTES
Patient ID: Ivana Blair is a 33 y.o. female.  Patient is here for follow up        Chief Complaint: Hyperprolactinemia      HPI    Consultation requested by Dr. Steph Thompson  Date of initial consultation with me:  August 2, 2018  HPI  Patient with long history of hyperprolactinemia and history of pituitary macroadenoma.  She has seen multiple endocrinologists in the past according to records and the last time she saw an endocrinologist prior to seeing me was Dr. Perla Carlos in 2015.  She was diagnosed according to records around 2001 with hyperprolactinemia and at that time the lesion might have been measured 1.3 cm but unfortunately she could not tolerate bromocriptine nor cabergoline.  She reports remembering prolactin levels as high as the 900s in the past however.  She states bromocriptine caused strange dreams as well as other side effects and the cabergoline causes significant drowsiness that affects her ability to work but she does realize that her prolactin levels have greatly increased and she is willing to try this 1 again    She did have a transsphenoidal pituitary resection performed by Dr. Wayne-February 27, 2015 as there is an MRI that was done the next day after surgery under care everywhere and shows postoperative changes.  Unfortunately her prolactin level remained elevated after surgery and gradually increased and she essentially has had amenorrhea for many years  LMP 2002  Given provera 2006 and recently given by her gynecologist    Lately also having mild galactorrhea with white nipple discharge mostly when she presses her breast or she may see it on her clothing    She has always had some feeling of sinus type headache or pressure but nothing severe and she denies any blurry vision or double vision and no hair loss or facial hair.  She was told she was insulin resistant but she has recently changed her diet and lost about 10 lb    Patient has a medical background as a  dietician     She states she had darkening of her skin consistent with acanthosis along her nasal labial folds and in her neck area that has lightened since she has lost weight and improved her diet-after last visit I did recheck her A1c which was still in prediabetic range at 5.7 but better than in the past when it was 6.2, she has acanthosis in her posterior neck area    Patient reports that when she did take a visual field exam in the past prior to her surgery it was normal    Update:  Just recently had MRI yesterday October 8, 2018 as insurance had a delay in approval:There is an asymmetric irregular and somewhat nodular appearance of the left side of the pituitary gland which may be in part related to prior surgery.  As a whole, the pituitary gland measures approximately 1.4 cm AP by 1.1 cm transverse by 1.1 cm cranial caudal.  Anterior portion of the gland appears more nodular in appearance and extends just above the superior margins of the sella.  Pituitary infundibulum is slightly deviated to the right and is of normal thickness.  Optic chiasm appears normal.    Patient admits she still has not started the Dostinex as she wanted to see the results    Today she reports she has had symptoms of depression since a teenager and she remembers when she was on the dopaminergic medication no symptoms also worsened    In terms of desire for fertility she does have a boyfriend but she has a stained from sexual activity for several years and does not plan to engage in sexual activity while on medication    I have reviewed the past medical, family and social history    Review of Systems   Constitutional: Negative for appetite change, fatigue, fever and unexpected weight change.   HENT: Negative for sore throat and trouble swallowing.    Eyes: Negative for visual disturbance.   Respiratory: Negative for shortness of breath and wheezing.    Cardiovascular: Negative for chest pain, palpitations and leg swelling.    Gastrointestinal: Negative for diarrhea, nausea and vomiting.   Endocrine: Negative for cold intolerance, heat intolerance, polydipsia, polyphagia and polyuria.   Genitourinary: Positive for menstrual problem. Negative for difficulty urinating and dysuria.   Musculoskeletal: Negative for arthralgias and joint swelling.   Skin: Negative for rash.   Neurological: Negative for dizziness, weakness, numbness and headaches.   Psychiatric/Behavioral: Negative for confusion, dysphoric mood and sleep disturbance.       Objective:      Physical Exam   Constitutional: No distress.   HENT:   Head: Normocephalic and atraumatic.   Eyes: Conjunctivae are normal. No scleral icterus.   Musculoskeletal: She exhibits no edema.   Neurological: She is alert. No sensory deficit.        Skin: Skin is warm and dry. No rash noted. She is not diaphoretic. No erythema.   Acanthosis in posterior neck   Psychiatric: She has a normal mood and affect. Her behavior is normal.   Vitals reviewed.        Lab Review:   Lab Visit on 08/06/2018   Component Date Value    Urine Volume 08/06/2018 900     Urine Collection Duration 08/06/2018 24     Urine, Creatinine 08/06/2018 220.0     Creatinine, Timed Urine 08/06/2018 82.5*    Creatinine, Ur (mg/spec) 08/06/2018 1980.0     Cortisol, 24H Ur 08/06/2018 13     Cortisol Clt Tm 08/06/2018 24     Urine Volume Cortisol 08/06/2018 900    Lab Visit on 08/02/2018   Component Date Value    ACTH 08/02/2018 24     Alpha-Subunit Pituitary * 08/02/2018 0.2     Somatomedin (IGF-I) 08/02/2018 137     Z Score 08/02/2018 -0.13     Prolactin 08/02/2018 377.8*    T3, Free 08/02/2018 2.1*    Free T4 08/02/2018 0.89     TSH 08/02/2018 1.343     Growth Hormone 08/02/2018 0.9     FSH 08/02/2018 0.30     Estradiol 08/02/2018 35     LH 08/02/2018 0.1     Sodium 08/02/2018 139     Potassium 08/02/2018 4.0     Chloride 08/02/2018 105     CO2 08/02/2018 26     Glucose 08/02/2018 86     BUN, Bld 08/02/2018  9     Creatinine 08/02/2018 1.0     Calcium 08/02/2018 9.8     Total Protein 08/02/2018 7.4     Albumin 08/02/2018 3.8     Total Bilirubin 08/02/2018 0.4     Alkaline Phosphatase 08/02/2018 56     AST 08/02/2018 28     ALT 08/02/2018 21     Anion Gap 08/02/2018 8     eGFR if African American 08/02/2018 >60.0     eGFR if non African Amer* 08/02/2018 >60.0     Hemoglobin A1C 08/02/2018 5.7*    Estimated Avg Glucose 08/02/2018 117     Cholesterol 08/02/2018 141     Triglycerides 08/02/2018 75     HDL 08/02/2018 45     LDL Cholesterol 08/02/2018 81.0     HDL/Chol Ratio 08/02/2018 31.9     Total Cholesterol/HDL Ra* 08/02/2018 3.1     Non-HDL Cholesterol 08/02/2018 96     Insulin 08/02/2018 9.6     Insulin Collection Inter* 08/02/2018 unk        Assessment:     1. Pituitary tumor  TSH    T4, free    T3, free    Prolactin    Ambulatory referral to Ophthalmology   2. Hyperprolactinemia  TSH    T4, free    T3, free    Prolactin    Ambulatory referral to Ophthalmology   3. Insulin resistance  metFORMIN (GLUCOPHAGE-XR) 500 MG 24 hr tablet    Patient with pituitary macroadenoma that is recurrent.  I strongly again urged patient to start on the Dostinex as it is very likely that the tumor size can be significantly reduced on medication and she is now willing to proceed with this.  Because she has some psychological side effects and she also has been dealing with what sounds like perhaps depression many years that might be intermittent I recommended that she sees our psychology/psychiatrist department if she develops worsening of those symptoms and in terms of combat adding fatigue that she gets on the medication since she is not exercising regularly I recommend increasing exercise to fight fatigue and continuing with weight loss efforts and I will add extended-release metformin for her pre diabetes    She will start with half tablet of Dostinex twice a week as tolerated and will get labs below in 4  weeks    I will refer to ophthalmology for formal visual field testing as it is up macroadenoma  Plan:   Pituitary tumor  -     TSH; Future; Expected date: 10/09/2018  -     T4, free; Future; Expected date: 10/09/2018  -     T3, free; Future; Expected date: 10/09/2018  -     Prolactin; Future; Expected date: 10/09/2018  -     Ambulatory referral to Ophthalmology    Hyperprolactinemia  -     TSH; Future; Expected date: 10/09/2018  -     T4, free; Future; Expected date: 10/09/2018  -     T3, free; Future; Expected date: 10/09/2018  -     Prolactin; Future; Expected date: 10/09/2018  -     Ambulatory referral to Ophthalmology    Insulin resistance  -     metFORMIN (GLUCOPHAGE-XR) 500 MG 24 hr tablet; Start with one tablet with dinner for one week;2nd week: Add 2nd tablet with dinner.  Dispense: 60 tablet; Refill: 3          Follow-up in about 3 months (around 1/9/2019).    Labs prior to appointment? not applicable     Disclaimer:  This note may have been partially prepared using voice recognition software and  it may have not been extensively proofed, as such there could be errors within the text such as sound alike errors.

## 2018-10-15 ENCOUNTER — OFFICE VISIT (OUTPATIENT)
Dept: OPHTHALMOLOGY | Facility: CLINIC | Age: 33
End: 2018-10-15
Payer: COMMERCIAL

## 2018-10-15 ENCOUNTER — OFFICE VISIT (OUTPATIENT)
Dept: OBSTETRICS AND GYNECOLOGY | Facility: CLINIC | Age: 33
End: 2018-10-15
Attending: INTERNAL MEDICINE
Payer: COMMERCIAL

## 2018-10-15 VITALS
DIASTOLIC BLOOD PRESSURE: 68 MMHG | BODY MASS INDEX: 46.95 KG/M2 | WEIGHT: 292.13 LBS | SYSTOLIC BLOOD PRESSURE: 128 MMHG | HEIGHT: 66 IN

## 2018-10-15 DIAGNOSIS — H52.03 HYPERMETROPIA OF BOTH EYES: ICD-10-CM

## 2018-10-15 DIAGNOSIS — Z13.5 GLAUCOMA SCREENING: ICD-10-CM

## 2018-10-15 DIAGNOSIS — Z87.898 H/O: PITUITARY TUMOR: Primary | ICD-10-CM

## 2018-10-15 DIAGNOSIS — N91.2 AMENORRHEA: ICD-10-CM

## 2018-10-15 DIAGNOSIS — Z86.018 HISTORY OF PROLACTINOMA: Primary | ICD-10-CM

## 2018-10-15 PROCEDURE — 99999 PR PBB SHADOW E&M-EST. PATIENT-LVL II: CPT | Mod: PBBFAC,,, | Performed by: OPTOMETRIST

## 2018-10-15 PROCEDURE — 92004 COMPRE OPH EXAM NEW PT 1/>: CPT | Mod: S$GLB,,, | Performed by: OPTOMETRIST

## 2018-10-15 PROCEDURE — 99212 OFFICE O/P EST SF 10 MIN: CPT | Mod: S$GLB,,, | Performed by: OBSTETRICS & GYNECOLOGY

## 2018-10-15 PROCEDURE — 3008F BODY MASS INDEX DOCD: CPT | Mod: CPTII,S$GLB,, | Performed by: OBSTETRICS & GYNECOLOGY

## 2018-10-15 PROCEDURE — 99999 PR PBB SHADOW E&M-EST. PATIENT-LVL II: CPT | Mod: PBBFAC,,, | Performed by: OBSTETRICS & GYNECOLOGY

## 2018-10-15 PROCEDURE — 92015 DETERMINE REFRACTIVE STATE: CPT | Mod: S$GLB,,, | Performed by: OPTOMETRIST

## 2018-10-15 NOTE — LETTER
October 15, 2018      Lorrie Toribio MD  [address] [place] [address]           [facility] - Ophthalmology  [address] [place] [address]  Phone: [phone]  Fax: [phone]          Patient: Ivana Blair   MR Number: [id number]   YOB: 1985   Date of Visit: 10/15/2018       Dear Dr. Lorrie Toribio:    Thank you for referring Ivana Blair to me for evaluation. Attached you will find relevant portions of my assessment and plan of care.    If you have questions, please do not hesitate to call me. I look forward to following Ivana Blair along with you.    Sincerely,    Susan Zarco, OD    Enclosure  CC:  No Recipients    If you would like to receive this communication electronically, please contact [email or web address] or [phone] to request more information on [employer] Link access.    For providers and/or their staff who would like to refer a patient to [facility], please contact us through our one-stop-shop provider referral line, [facility], at [phone].    If you feel you have received this communication in error or would no longer like to receive these types of communications, please e-mail [email or web address]

## 2018-10-15 NOTE — LETTER
October 16, 2018      Lorrie Toribio MD  [address] [place] [id number]           [facility] - [facility]  [address] [facility] [address]  Phone: [phone]  Fax: [phone]          Patient: Ivana Blair   MR Number: [id number]   YOB: 1985   Date of Visit: 10/15/2018       Dear Dr. Lorrie Toribio:    Thank you for referring Ivana Blair to me for evaluation. Attached you will find relevant portions of my assessment and plan of care.    If you have questions, please do not hesitate to call me. I look forward to following Ivana Blair along with you.    Sincerely,    Steph Thompson MD    Enclosure  CC:  No Recipients    If you would like to receive this communication electronically, please contact [email or web address] or [phone] to request more information on [employer] Link access.    For providers and/or their staff who would like to refer a patient to [facility], please contact us through our one-stop-shop provider referral line, [facility], at [phone].    If you feel you have received this communication in error or would no longer like to receive these types of communications, please e-mail [email or web address]

## 2018-10-15 NOTE — PROGRESS NOTES
HPI     Eye Exam      Additional comments: Yearly              Comments     Last seen by [facility] in 2015 +Pituitary tumor surgery in 2015 that removed   most of the tumor.  Last VF in 2015 before the surgery.  VF was normal at   that time.  Patient states that a recent MRI revealed that pituitary tumor   has enlarged somewhat.  Patient has had problems with hormone regulation   since the surgery.  Patient here today for yearly eye exam  No noticeable change sin vision since last eye exam. + Patient had 1   episode of blurred vision that lasted about 5 minutes.  It happened on a   work day while driving.  Pulled off road until it cleared.  Patient has   frequent headaches, so unable to associate the blurred episode with a   headache.  Patient states she has a pituitary tumor that has been present for 20   years  Patient is followed by Dr. Lorrie Toribio for Tumor  No other complaints  No drops  No correction          Last edited by Susan Zarco, OD on 10/15/2018 11:31 AM. (History)            Assessment /Plan     For exam results, see Encounter Report.    H/O: pituitary tumor    Glaucoma screening    Hypermetropia of both eyes      Episode of blurred vision could be secondary to accommodative spasm.  No glasses indicated at this time.  Glaucoma screening and fundus exam normal.  No glasses indicated.  Return to clinic for visual field - looking for any effects of pituitary tumor.  Will send a report to Dr. Toribio.

## 2018-10-16 NOTE — PROGRESS NOTES
CHIEF COMPLAINT:   Chief Complaint   Patient presents with    Follow-up     amenorrhea        HISTORY OF PRESENT ILLNESS    Ivana Blair 33 y.o. G0  Here for amenorrhea f/u. Desires to defer an exam today. She is following w [facility] and is taking cabergoline.  No menses yet. Feels well. No pelvic pain. Not sexually active.     HISTORY  Patient Active Problem List   Diagnosis    Benign prolactinoma    Non-pregnancy related A-G syndrome    Morbid obesity    Vitamin D insufficiency       Past Medical History:   Diagnosis Date    Allergy     sinus    Anxiety     B12 deficiency     Benign prolactinoma     s/p surgery    Depression     Hx of varicella     Pituitary tumor     Vitamin D deficiency        Past Surgical History:   Procedure Laterality Date    ADENOIDECTOMY      2007    NASAL JOSE MGIUEL BULLOSA RESECTION      2013    TONSILLECTOMY      2007    TUMOR REMOVAL  02/25/2015    Prolactinoma resected by [facility]       Family History   Problem Relation Age of Onset    Anemia Mother     Gout Father     Cancer Maternal Grandfather         prostate    Cataracts Maternal Grandfather     Rheumatologic disease Paternal Grandmother     Cancer Paternal Grandfather         lung    Anemia Sister     Heart disease Maternal Aunt     Alzheimer's disease Maternal Grandmother        Social History     Socioeconomic History    Marital status: Single     Spouse name: None    Number of children: 0    Years of education: None    Highest education level: None   Social Needs    Financial resource strain: None    Food insecurity - worry: None    Food insecurity - inability: None    Transportation needs - medical: None    Transportation needs - non-medical: None   Occupational History    Occupation: Customer Service     Comment: [employer]   Tobacco Use    Smoking status: Never Smoker    Smokeless tobacco: Never Used   Substance and Sexual Activity    Alcohol use: No     Alcohol/week: 0.0 oz    Drug  use: No    Sexual activity: Not Currently     Partners: Male     Birth control/protection: None   Other Topics Concern    Are you pregnant or think you may be? No    Breast-feeding No   Social History Narrative    Wears a seatbelt.       Current Outpatient Medications   Medication Sig Dispense Refill    cabergoline (DOSTINEX) 0.5 mg tablet Take 1 tablet twice a week 8 tablet 11    metFORMIN (GLUCOPHAGE-XR) 500 MG 24 hr tablet Start with one tablet with dinner for one week;2nd week: Add 2nd tablet with dinner. 60 tablet 3    medroxyPROGESTERone (PROVERA) 10 MG tablet Take 1 tablet (10 mg total) by mouth once daily. 14 tablet 1     No current facility-administered medications for this visit.        Review of patient's allergies indicates:   Allergen Reactions    Pollen,fermented Itching           PHYSICAL EXAM     Vitals:    10/15/18 1404   BP: 128/68       PAIN SCALE: 0/10 None    ROS:  GENERAL: No fever, chills, fatigability or weight loss.  ABDOMEN: Appetite fine. No weight loss. Denies diarrhea, abdominal pain, hematemesis or blood in stool.  URINARY: No flank pain, dysuria or hematuria.  BREASTS: Breasts symmetric, nontender and no lumps detected.    PE:   APPEARANCE: Well nourished, well developed, in no acute distress.       COUNSELING:  Patient was counseled today on A.C.S. Pap guidelines and recommendations for yearly pelvic exams, mammograms and monthly self breast exams; to see her PCP for other health maintenance.   If still no menses, will investigate further. For now, this is typical of prolactinoma for pt's to experience amenorrhea. As the cabergoline starts to works, revuewed w pt she may get ovluation before she sees a cycle, so to use back up contraception such as condoms if she becomes sexually active.    FOLLOW-UP: With me for annual around srping 2019.

## 2018-10-25 ENCOUNTER — OFFICE VISIT (OUTPATIENT)
Dept: OPHTHALMOLOGY | Facility: CLINIC | Age: 33
End: 2018-10-25
Payer: COMMERCIAL

## 2018-10-25 ENCOUNTER — IMMUNIZATION (OUTPATIENT)
Dept: INTERNAL MEDICINE | Facility: CLINIC | Age: 33
End: 2018-10-25
Payer: COMMERCIAL

## 2018-10-25 ENCOUNTER — PATIENT MESSAGE (OUTPATIENT)
Dept: ENDOCRINOLOGY | Facility: CLINIC | Age: 33
End: 2018-10-25

## 2018-10-25 DIAGNOSIS — Z87.898 H/O: PITUITARY TUMOR: Primary | ICD-10-CM

## 2018-10-25 PROCEDURE — 90471 IMMUNIZATION ADMIN: CPT | Mod: S$GLB,,, | Performed by: FAMILY MEDICINE

## 2018-10-25 PROCEDURE — 90686 IIV4 VACC NO PRSV 0.5 ML IM: CPT | Mod: S$GLB,,, | Performed by: FAMILY MEDICINE

## 2018-10-25 PROCEDURE — 99999 PR PBB SHADOW E&M-EST. PATIENT-LVL II: CPT | Mod: PBBFAC,,, | Performed by: OPTOMETRIST

## 2018-10-25 PROCEDURE — 92012 INTRM OPH EXAM EST PATIENT: CPT | Mod: S$GLB,,, | Performed by: OPTOMETRIST

## 2018-10-25 PROCEDURE — 92083 EXTENDED VISUAL FIELD XM: CPT | Mod: S$GLB,,, | Performed by: OPTOMETRIST

## 2018-10-25 NOTE — PROGRESS NOTES
HPI     Follow-up      Additional comments: HVF              Comments     Last seen by [facility] on 10/15/18 for yearly eye exam  Patient here today for HVF for pituitary tumor  Vision stable  No complaints  HVF; 10/25/18          Last edited by Mary Ann Cavazos, PCT on 10/25/2018  1:23 PM.   (History)            Assessment /Plan     For exam results, see Encounter Report.    H/O: pituitary tumor  -     [facility]ney Visual Field - OU - Extended - Both Eyes      Normal visual field.  No apparent field defect in either eye.  Return to clinic as needed..

## 2018-11-12 ENCOUNTER — PATIENT MESSAGE (OUTPATIENT)
Dept: ENDOCRINOLOGY | Facility: CLINIC | Age: 33
End: 2018-11-12

## 2018-11-12 DIAGNOSIS — F32.A DEPRESSION, UNSPECIFIED DEPRESSION TYPE: Primary | ICD-10-CM

## 2018-11-27 ENCOUNTER — PATIENT MESSAGE (OUTPATIENT)
Dept: ENDOCRINOLOGY | Facility: CLINIC | Age: 33
End: 2018-11-27

## 2018-11-29 ENCOUNTER — LAB VISIT (OUTPATIENT)
Dept: LAB | Facility: HOSPITAL | Age: 33
End: 2018-11-29
Attending: INTERNAL MEDICINE
Payer: COMMERCIAL

## 2018-11-29 DIAGNOSIS — E22.1 HYPERPROLACTINEMIA: ICD-10-CM

## 2018-11-29 DIAGNOSIS — D49.7 PITUITARY TUMOR: ICD-10-CM

## 2018-11-29 PROCEDURE — 84443 ASSAY THYROID STIM HORMONE: CPT

## 2018-11-29 PROCEDURE — 84439 ASSAY OF FREE THYROXINE: CPT

## 2018-11-29 PROCEDURE — 84146 ASSAY OF PROLACTIN: CPT

## 2018-11-29 PROCEDURE — 36415 COLL VENOUS BLD VENIPUNCTURE: CPT | Mod: PO

## 2018-11-29 PROCEDURE — 84481 FREE ASSAY (FT-3): CPT

## 2018-11-30 LAB
PROLACTIN SERPL IA-MCNC: 97.5 NG/ML
T3FREE SERPL-MCNC: 2.7 PG/ML
T4 FREE SERPL-MCNC: 0.78 NG/DL
TSH SERPL DL<=0.005 MIU/L-ACNC: 1.73 UIU/ML

## 2018-12-04 ENCOUNTER — PATIENT MESSAGE (OUTPATIENT)
Dept: ENDOCRINOLOGY | Facility: CLINIC | Age: 33
End: 2018-12-04

## 2018-12-04 DIAGNOSIS — E22.1 HYPERPROLACTINEMIA: ICD-10-CM

## 2018-12-04 DIAGNOSIS — D49.7 PITUITARY TUMOR: Primary | ICD-10-CM

## 2018-12-05 ENCOUNTER — TELEPHONE (OUTPATIENT)
Dept: ENDOCRINOLOGY | Facility: CLINIC | Age: 33
End: 2018-12-05

## 2018-12-05 NOTE — TELEPHONE ENCOUNTER
----- Message from Lorrie Toribio MD sent at 12/4/2018  5:15 PM CST -----  Please schedule a prolactin lab appointment no more than 3-5 days prior to her upcoming visit with me in January

## 2019-01-11 ENCOUNTER — TELEPHONE (OUTPATIENT)
Dept: INTERNAL MEDICINE | Facility: CLINIC | Age: 34
End: 2019-01-11

## 2019-01-11 NOTE — TELEPHONE ENCOUNTER
Called pt and let her know that Dr. Bishop out of clinic the afternoon of 1-31-19, she said that she will reschedule through the portal.

## 2019-01-21 ENCOUNTER — LAB VISIT (OUTPATIENT)
Dept: LAB | Facility: HOSPITAL | Age: 34
End: 2019-01-21
Attending: INTERNAL MEDICINE
Payer: COMMERCIAL

## 2019-01-21 DIAGNOSIS — D49.7 PITUITARY TUMOR: ICD-10-CM

## 2019-01-21 DIAGNOSIS — E22.1 HYPERPROLACTINEMIA: ICD-10-CM

## 2019-01-21 LAB — PROLACTIN SERPL IA-MCNC: 32.3 NG/ML

## 2019-01-21 PROCEDURE — 84146 ASSAY OF PROLACTIN: CPT

## 2019-01-21 PROCEDURE — 36415 COLL VENOUS BLD VENIPUNCTURE: CPT

## 2019-01-29 ENCOUNTER — OFFICE VISIT (OUTPATIENT)
Dept: ENDOCRINOLOGY | Facility: CLINIC | Age: 34
End: 2019-01-29
Payer: COMMERCIAL

## 2019-01-29 VITALS
DIASTOLIC BLOOD PRESSURE: 84 MMHG | HEIGHT: 66 IN | WEIGHT: 283.5 LBS | HEART RATE: 68 BPM | BODY MASS INDEX: 45.56 KG/M2 | TEMPERATURE: 98 F | SYSTOLIC BLOOD PRESSURE: 124 MMHG

## 2019-01-29 DIAGNOSIS — E22.1 HYPERPROLACTINEMIA: ICD-10-CM

## 2019-01-29 DIAGNOSIS — D49.7 PITUITARY TUMOR: Primary | ICD-10-CM

## 2019-01-29 DIAGNOSIS — E88.819 INSULIN RESISTANCE: ICD-10-CM

## 2019-01-29 PROCEDURE — 99214 PR OFFICE/OUTPT VISIT, EST, LEVL IV, 30-39 MIN: ICD-10-PCS | Mod: S$GLB,,, | Performed by: INTERNAL MEDICINE

## 2019-01-29 PROCEDURE — 99999 PR PBB SHADOW E&M-EST. PATIENT-LVL III: ICD-10-PCS | Mod: PBBFAC,,, | Performed by: INTERNAL MEDICINE

## 2019-01-29 PROCEDURE — 99214 OFFICE O/P EST MOD 30 MIN: CPT | Mod: S$GLB,,, | Performed by: INTERNAL MEDICINE

## 2019-01-29 PROCEDURE — 3008F BODY MASS INDEX DOCD: CPT | Mod: CPTII,S$GLB,, | Performed by: INTERNAL MEDICINE

## 2019-01-29 PROCEDURE — 99999 PR PBB SHADOW E&M-EST. PATIENT-LVL III: CPT | Mod: PBBFAC,,, | Performed by: INTERNAL MEDICINE

## 2019-01-29 PROCEDURE — 3008F PR BODY MASS INDEX (BMI) DOCUMENTED: ICD-10-PCS | Mod: CPTII,S$GLB,, | Performed by: INTERNAL MEDICINE

## 2019-01-29 NOTE — PROGRESS NOTES
Patient ID: Ivana Blair is a 33 y.o. female.  Patient is here for follow up        Chief Complaint: Hyperprolactinemia      HPI    Consultation requested by Dr. Steph Thompson  Date of initial consultation with me:  August 2, 2018  HPI  Patient with long history of hyperprolactinemia and history of pituitary macroadenoma.  She has seen multiple endocrinologists in the past according to records and the last time she saw an endocrinologist prior to seeing me was Dr. Perla Carlos in 2015.  She was diagnosed according to records around 2001 with hyperprolactinemia and at that time the lesion might have been measured 1.3 cm but unfortunately she could not tolerate bromocriptine nor cabergoline.  She reports remembering prolactin levels as high as the 900s in the past however.  She states bromocriptine caused strange dreams as well as other side effects and the cabergoline causes significant drowsiness that affects her ability to work but she does realize that her prolactin levels have greatly increased and she is willing to try this 1 again    She did have a transsphenoidal pituitary resection performed by Dr. Wayne-February 27, 2015 as there is an MRI that was done the next day after surgery under care everywhere and shows postoperative changes.  Unfortunately her prolactin level remained elevated after surgery and gradually increased and she essentially has had amenorrhea for many years  LMP 2002       I repeated her MRI of her pituitary which she had October 2018 and it showed recurrence of pituitary tumor measuring 1.4 cm and she had an ophthalmology evaluation and her visual fields were normal and she agreed to start back on Dostinex half tablet twice a week which she started October 2018 and she is not having significant side effects.  She does experience vivid dreams that seemed very real but other than that is tolerating it well    In the past she had mild galactorrhea with white nipple discharge  mostly when she presses her breast or she may see it on her clothing  Currently denies any headaches  Patient has a medical background as a dietician     She  had darkening of her skin consistent with acanthosis along her nasolabial folds and in her neck area that has lightened and nearly review is all since she has continued to lose weight and she is also on extended-release metformin. I did recheck her A1c which was still in prediabetic range at in 2018 5.7 but better than in the past when it was 6.2    Patient reports that when she did take a visual field exam in the past prior to her surgery it was normal      In the past she reported  she has had symptoms of depression since a teenager and she remembers also having depressive symptoms when she was on the dopaminergic medication in the past but lately she has been feeling good as far as her mood.     In terms of desire for fertility she stated in the past that she does have a boyfriend but she has abstained from sexual activity for several years and does not plan to engage in sexual activity while on medication    And unfortunately she still has not yet had a menstrual cycle.    Her prolactin though has dramatically improved down to 32  I have reviewed the past medical, family and social history    Review of Systems   Constitutional: Negative for appetite change, fatigue, fever and unexpected weight change.   HENT: Negative for sore throat and trouble swallowing.    Eyes: Negative for visual disturbance.   Respiratory: Negative for shortness of breath and wheezing.    Cardiovascular: Negative for chest pain, palpitations and leg swelling.   Gastrointestinal: Negative for diarrhea, nausea and vomiting.   Endocrine: Negative for cold intolerance, heat intolerance, polydipsia, polyphagia and polyuria.   Genitourinary: Positive for menstrual problem. Negative for difficulty urinating and dysuria.   Musculoskeletal: Negative for arthralgias and joint swelling.    Skin: Negative for rash.   Neurological: Negative for dizziness, weakness, numbness and headaches.   Psychiatric/Behavioral: Negative for confusion, dysphoric mood and sleep disturbance.       Objective:      Physical Exam   Constitutional: She appears well-developed and well-nourished. No distress.   HENT:   Head: Normocephalic and atraumatic.   Eyes: Conjunctivae are normal.   Musculoskeletal: She exhibits no edema.   Neurological: She is alert. No sensory deficit.        Skin: Skin is warm and dry. No rash noted. She is not diaphoretic. No erythema.   Psychiatric: She has a normal mood and affect. Her behavior is normal.   Vitals reviewed.        Lab Review:   Lab Visit on 01/21/2019   Component Date Value    Prolactin 01/21/2019 32.3*   Lab Visit on 11/29/2018   Component Date Value    TSH 11/29/2018 1.727     Free T4 11/29/2018 0.78     T3, Free 11/29/2018 2.7     Prolactin 11/29/2018 97.5*   Lab Visit on 08/06/2018   Component Date Value    Urine Volume 08/06/2018 900     Urine Collection Duration 08/06/2018 24     Urine, Creatinine 08/06/2018 220.0     Creatinine, Timed Urine 08/06/2018 82.5*    Creatinine, Ur (mg/spec) 08/06/2018 1980.0     Cortisol, 24H Ur 08/06/2018 13     Cortisol Clt Tm 08/06/2018 24     Urine Volume Cortisol 08/06/2018 900    Lab Visit on 08/02/2018   Component Date Value    ACTH 08/02/2018 24     Alpha-Subunit Pituitary * 08/02/2018 0.2     Somatomedin (IGF-I) 08/02/2018 137     Z Score 08/02/2018 -0.13     Prolactin 08/02/2018 377.8*    T3, Free 08/02/2018 2.1*    Free T4 08/02/2018 0.89     TSH 08/02/2018 1.343     Growth Hormone 08/02/2018 0.9     FSH 08/02/2018 0.30     Estradiol 08/02/2018 35     LH 08/02/2018 0.1     Sodium 08/02/2018 139     Potassium 08/02/2018 4.0     Chloride 08/02/2018 105     CO2 08/02/2018 26     Glucose 08/02/2018 86     BUN, Bld 08/02/2018 9     Creatinine 08/02/2018 1.0     Calcium 08/02/2018 9.8     Total Protein  08/02/2018 7.4     Albumin 08/02/2018 3.8     Total Bilirubin 08/02/2018 0.4     Alkaline Phosphatase 08/02/2018 56     AST 08/02/2018 28     ALT 08/02/2018 21     Anion Gap 08/02/2018 8     eGFR if African American 08/02/2018 >60.0     eGFR if non African Amer* 08/02/2018 >60.0     Hemoglobin A1C 08/02/2018 5.7*    Estimated Avg Glucose 08/02/2018 117     Cholesterol 08/02/2018 141     Triglycerides 08/02/2018 75     HDL 08/02/2018 45     LDL Cholesterol 08/02/2018 81.0     HDL/Chol Ratio 08/02/2018 31.9     Total Cholesterol/HDL Ra* 08/02/2018 3.1     Non-HDL Cholesterol 08/02/2018 96     Insulin 08/02/2018 9.6     Insulin Collection Inter* 08/02/2018 unk        Assessment:     1. Pituitary tumor  Hemoglobin A1c    Basic metabolic panel    Prolactin    Estradiol    Follicle stimulating hormone    Luteinizing hormone    TSH    T4, free    T3, free   2. Hyperprolactinemia  Hemoglobin A1c    Basic metabolic panel    Prolactin    Estradiol    Follicle stimulating hormone    Luteinizing hormone    TSH    T4, free    T3, free   3. Insulin resistance  Hemoglobin A1c    Basic metabolic panel    Prolactin    Estradiol    Follicle stimulating hormone    Luteinizing hormone    TSH    T4, free    T3, free    patient has had marked improvement of her prolactin level on Dostinex and she is tolerating it fairly well.  Continue this and my hope is that her gonadotropin levels will improve and in turned she will regain a normal menstrual cycle but time will tell.  She states her neurosurgeon had told her that mostly just to tumor was obtained with careful effort to not disrupt normal pituitary tissue    She has done exceedingly well also with diet and exercise and weight loss regarding her pre diabetes.  She can continue on metformin  Plan:   Pituitary tumor  -     Hemoglobin A1c; Future; Expected date: 04/16/2019  -     Basic metabolic panel; Future; Expected date: 04/16/2019  -     Prolactin; Future; Expected  date: 04/16/2019  -     Estradiol; Future; Expected date: 04/16/2019  -     Follicle stimulating hormone; Future; Expected date: 04/16/2019  -     Luteinizing hormone; Future; Expected date: 04/16/2019  -     TSH; Future; Expected date: 04/16/2019  -     T4, free; Future; Expected date: 04/16/2019  -     T3, free; Future; Expected date: 04/16/2019    Hyperprolactinemia  -     Hemoglobin A1c; Future; Expected date: 04/16/2019  -     Basic metabolic panel; Future; Expected date: 04/16/2019  -     Prolactin; Future; Expected date: 04/16/2019  -     Estradiol; Future; Expected date: 04/16/2019  -     Follicle stimulating hormone; Future; Expected date: 04/16/2019  -     Luteinizing hormone; Future; Expected date: 04/16/2019  -     TSH; Future; Expected date: 04/16/2019  -     T4, free; Future; Expected date: 04/16/2019  -     T3, free; Future; Expected date: 04/16/2019    Insulin resistance  -     Hemoglobin A1c; Future; Expected date: 04/16/2019  -     Basic metabolic panel; Future; Expected date: 04/16/2019  -     Prolactin; Future; Expected date: 04/16/2019  -     Estradiol; Future; Expected date: 04/16/2019  -     Follicle stimulating hormone; Future; Expected date: 04/16/2019  -     Luteinizing hormone; Future; Expected date: 04/16/2019  -     TSH; Future; Expected date: 04/16/2019  -     T4, free; Future; Expected date: 04/16/2019  -     T3, free; Future; Expected date: 04/16/2019          Follow-up in about 3 months (around 4/29/2019).    Labs prior to appointment? yes     Disclaimer:  This note may have been partially prepared using voice recognition software and  it may have not been extensively proofed, as such there could be errors within the text such as sound alike errors.

## 2019-02-12 ENCOUNTER — PATIENT MESSAGE (OUTPATIENT)
Dept: ENDOCRINOLOGY | Facility: CLINIC | Age: 34
End: 2019-02-12

## 2019-04-12 ENCOUNTER — PATIENT MESSAGE (OUTPATIENT)
Dept: INTERNAL MEDICINE | Facility: CLINIC | Age: 34
End: 2019-04-12

## 2019-04-12 DIAGNOSIS — Z00.00 ROUTINE ADULT HEALTH MAINTENANCE: Primary | ICD-10-CM

## 2019-04-17 ENCOUNTER — PATIENT MESSAGE (OUTPATIENT)
Dept: OBSTETRICS AND GYNECOLOGY | Facility: CLINIC | Age: 34
End: 2019-04-17

## 2019-04-17 ENCOUNTER — TELEPHONE (OUTPATIENT)
Dept: OBSTETRICS AND GYNECOLOGY | Facility: CLINIC | Age: 34
End: 2019-04-17

## 2019-04-17 NOTE — TELEPHONE ENCOUNTER
Attempted to call pt. Unable to leave voicemail for pt. Will try again later. Pt portal message sent. appt need to be r/s Dr Thompson will be in surgery.

## 2019-04-22 ENCOUNTER — PATIENT MESSAGE (OUTPATIENT)
Dept: INTERNAL MEDICINE | Facility: CLINIC | Age: 34
End: 2019-04-22

## 2019-04-24 ENCOUNTER — LAB VISIT (OUTPATIENT)
Dept: LAB | Facility: HOSPITAL | Age: 34
End: 2019-04-24
Payer: COMMERCIAL

## 2019-04-24 DIAGNOSIS — E22.1 HYPERPROLACTINEMIA: ICD-10-CM

## 2019-04-24 DIAGNOSIS — Z00.00 ROUTINE ADULT HEALTH MAINTENANCE: ICD-10-CM

## 2019-04-24 DIAGNOSIS — E88.819 INSULIN RESISTANCE: ICD-10-CM

## 2019-04-24 DIAGNOSIS — D49.7 PITUITARY TUMOR: ICD-10-CM

## 2019-04-24 LAB
ALBUMIN SERPL BCP-MCNC: 3.6 G/DL (ref 3.5–5.2)
ALP SERPL-CCNC: 52 U/L (ref 55–135)
ALT SERPL W/O P-5'-P-CCNC: 15 U/L (ref 10–44)
ANION GAP SERPL CALC-SCNC: 7 MMOL/L (ref 8–16)
ANION GAP SERPL CALC-SCNC: 7 MMOL/L (ref 8–16)
AST SERPL-CCNC: 19 U/L (ref 10–40)
BASOPHILS # BLD AUTO: 0.04 K/UL (ref 0–0.2)
BASOPHILS NFR BLD: 0.5 % (ref 0–1.9)
BILIRUB SERPL-MCNC: 0.2 MG/DL (ref 0.1–1)
BUN SERPL-MCNC: 14 MG/DL (ref 6–20)
BUN SERPL-MCNC: 14 MG/DL (ref 6–20)
CALCIUM SERPL-MCNC: 9.6 MG/DL (ref 8.7–10.5)
CALCIUM SERPL-MCNC: 9.6 MG/DL (ref 8.7–10.5)
CHLORIDE SERPL-SCNC: 105 MMOL/L (ref 95–110)
CHLORIDE SERPL-SCNC: 105 MMOL/L (ref 95–110)
CHOLEST SERPL-MCNC: 164 MG/DL (ref 120–199)
CHOLEST/HDLC SERPL: 2.9 {RATIO} (ref 2–5)
CO2 SERPL-SCNC: 28 MMOL/L (ref 23–29)
CO2 SERPL-SCNC: 28 MMOL/L (ref 23–29)
CREAT SERPL-MCNC: 1 MG/DL (ref 0.5–1.4)
CREAT SERPL-MCNC: 1 MG/DL (ref 0.5–1.4)
DIFFERENTIAL METHOD: ABNORMAL
EOSINOPHIL # BLD AUTO: 0.2 K/UL (ref 0–0.5)
EOSINOPHIL NFR BLD: 2.4 % (ref 0–8)
ERYTHROCYTE [DISTWIDTH] IN BLOOD BY AUTOMATED COUNT: 14.8 % (ref 11.5–14.5)
EST. GFR  (AFRICAN AMERICAN): >60 ML/MIN/1.73 M^2
EST. GFR  (AFRICAN AMERICAN): >60 ML/MIN/1.73 M^2
EST. GFR  (NON AFRICAN AMERICAN): >60 ML/MIN/1.73 M^2
EST. GFR  (NON AFRICAN AMERICAN): >60 ML/MIN/1.73 M^2
ESTIMATED AVG GLUCOSE: 131 MG/DL (ref 68–131)
ESTRADIOL SERPL-MCNC: 16 PG/ML
FSH SERPL-ACNC: 0.3 MIU/ML
GLUCOSE SERPL-MCNC: 88 MG/DL (ref 70–110)
GLUCOSE SERPL-MCNC: 88 MG/DL (ref 70–110)
HBA1C MFR BLD HPLC: 6.2 % (ref 4–5.6)
HCT VFR BLD AUTO: 38.3 % (ref 37–48.5)
HDLC SERPL-MCNC: 57 MG/DL (ref 40–75)
HDLC SERPL: 34.8 % (ref 20–50)
HGB BLD-MCNC: 11.7 G/DL (ref 12–16)
IMM GRANULOCYTES # BLD AUTO: 0.03 K/UL (ref 0–0.04)
IMM GRANULOCYTES NFR BLD AUTO: 0.4 % (ref 0–0.5)
LDLC SERPL CALC-MCNC: 99.2 MG/DL (ref 63–159)
LH SERPL-ACNC: 0.1 MIU/ML
LYMPHOCYTES # BLD AUTO: 2.8 K/UL (ref 1–4.8)
LYMPHOCYTES NFR BLD: 34.7 % (ref 18–48)
MCH RBC QN AUTO: 26.8 PG (ref 27–31)
MCHC RBC AUTO-ENTMCNC: 30.5 G/DL (ref 32–36)
MCV RBC AUTO: 88 FL (ref 82–98)
MONOCYTES # BLD AUTO: 0.5 K/UL (ref 0.3–1)
MONOCYTES NFR BLD: 6.5 % (ref 4–15)
NEUTROPHILS # BLD AUTO: 4.4 K/UL (ref 1.8–7.7)
NEUTROPHILS NFR BLD: 55.5 % (ref 38–73)
NONHDLC SERPL-MCNC: 107 MG/DL
NRBC BLD-RTO: 0 /100 WBC
PLATELET # BLD AUTO: 268 K/UL (ref 150–350)
PMV BLD AUTO: 11.7 FL (ref 9.2–12.9)
POTASSIUM SERPL-SCNC: 4.4 MMOL/L (ref 3.5–5.1)
POTASSIUM SERPL-SCNC: 4.4 MMOL/L (ref 3.5–5.1)
PROLACTIN SERPL IA-MCNC: 24.1 NG/ML (ref 5.2–26.5)
PROT SERPL-MCNC: 7.4 G/DL (ref 6–8.4)
RBC # BLD AUTO: 4.37 M/UL (ref 4–5.4)
SODIUM SERPL-SCNC: 140 MMOL/L (ref 136–145)
SODIUM SERPL-SCNC: 140 MMOL/L (ref 136–145)
T3FREE SERPL-MCNC: 2.4 PG/ML (ref 2.3–4.2)
T4 FREE SERPL-MCNC: 0.83 NG/DL (ref 0.71–1.51)
TRIGL SERPL-MCNC: 39 MG/DL (ref 30–150)
TSH SERPL DL<=0.005 MIU/L-ACNC: 1.39 UIU/ML (ref 0.4–4)
WBC # BLD AUTO: 7.96 K/UL (ref 3.9–12.7)

## 2019-04-24 PROCEDURE — 83001 ASSAY OF GONADOTROPIN (FSH): CPT

## 2019-04-24 PROCEDURE — 84481 FREE ASSAY (FT-3): CPT

## 2019-04-24 PROCEDURE — 80061 LIPID PANEL: CPT

## 2019-04-24 PROCEDURE — 84439 ASSAY OF FREE THYROXINE: CPT

## 2019-04-24 PROCEDURE — 80053 COMPREHEN METABOLIC PANEL: CPT

## 2019-04-24 PROCEDURE — 84146 ASSAY OF PROLACTIN: CPT

## 2019-04-24 PROCEDURE — 85025 COMPLETE CBC W/AUTO DIFF WBC: CPT

## 2019-04-24 PROCEDURE — 82670 ASSAY OF TOTAL ESTRADIOL: CPT

## 2019-04-24 PROCEDURE — 83036 HEMOGLOBIN GLYCOSYLATED A1C: CPT

## 2019-04-24 PROCEDURE — 83002 ASSAY OF GONADOTROPIN (LH): CPT

## 2019-04-24 PROCEDURE — 84443 ASSAY THYROID STIM HORMONE: CPT

## 2019-04-24 PROCEDURE — 36415 COLL VENOUS BLD VENIPUNCTURE: CPT

## 2019-04-25 ENCOUNTER — PATIENT MESSAGE (OUTPATIENT)
Dept: ENDOCRINOLOGY | Facility: CLINIC | Age: 34
End: 2019-04-25

## 2019-04-30 ENCOUNTER — OFFICE VISIT (OUTPATIENT)
Dept: ENDOCRINOLOGY | Facility: CLINIC | Age: 34
End: 2019-04-30
Payer: COMMERCIAL

## 2019-04-30 VITALS
SYSTOLIC BLOOD PRESSURE: 128 MMHG | WEIGHT: 281 LBS | HEIGHT: 66 IN | DIASTOLIC BLOOD PRESSURE: 86 MMHG | OXYGEN SATURATION: 98 % | BODY MASS INDEX: 45.16 KG/M2 | HEART RATE: 73 BPM

## 2019-04-30 DIAGNOSIS — E88.819 INSULIN RESISTANCE: ICD-10-CM

## 2019-04-30 DIAGNOSIS — E55.9 VITAMIN D DEFICIENCY: ICD-10-CM

## 2019-04-30 DIAGNOSIS — E23.0 HYPOGONADOTROPIC HYPOGONADISM: ICD-10-CM

## 2019-04-30 DIAGNOSIS — D49.7 PITUITARY TUMOR: Primary | ICD-10-CM

## 2019-04-30 DIAGNOSIS — E22.1 HYPERPROLACTINEMIA: ICD-10-CM

## 2019-04-30 DIAGNOSIS — E66.01 MORBID OBESITY WITH BMI OF 45.0-49.9, ADULT: ICD-10-CM

## 2019-04-30 DIAGNOSIS — N91.2 AMENORRHEA: ICD-10-CM

## 2019-04-30 PROCEDURE — 99999 PR PBB SHADOW E&M-EST. PATIENT-LVL IV: CPT | Mod: PBBFAC,,, | Performed by: INTERNAL MEDICINE

## 2019-04-30 PROCEDURE — 99214 PR OFFICE/OUTPT VISIT, EST, LEVL IV, 30-39 MIN: ICD-10-PCS | Mod: S$GLB,,, | Performed by: INTERNAL MEDICINE

## 2019-04-30 PROCEDURE — 3008F PR BODY MASS INDEX (BMI) DOCUMENTED: ICD-10-PCS | Mod: CPTII,S$GLB,, | Performed by: INTERNAL MEDICINE

## 2019-04-30 PROCEDURE — 99214 OFFICE O/P EST MOD 30 MIN: CPT | Mod: S$GLB,,, | Performed by: INTERNAL MEDICINE

## 2019-04-30 PROCEDURE — 99999 PR PBB SHADOW E&M-EST. PATIENT-LVL IV: ICD-10-PCS | Mod: PBBFAC,,, | Performed by: INTERNAL MEDICINE

## 2019-04-30 PROCEDURE — 3008F BODY MASS INDEX DOCD: CPT | Mod: CPTII,S$GLB,, | Performed by: INTERNAL MEDICINE

## 2019-04-30 RX ORDER — CHOLECALCIFEROL (VITAMIN D3) 25 MCG
1000 TABLET ORAL DAILY
Qty: 30 TABLET | Refills: 11 | Status: SHIPPED | OUTPATIENT
Start: 2019-04-30 | End: 2019-07-08 | Stop reason: SDUPTHER

## 2019-04-30 RX ORDER — CALCIUM CARBONATE 500(1250)
1 TABLET ORAL 2 TIMES DAILY WITH MEALS
Qty: 60 TABLET | Refills: 11 | Status: SHIPPED | OUTPATIENT
Start: 2019-04-30 | End: 2019-07-08 | Stop reason: SDUPTHER

## 2019-04-30 RX ORDER — METFORMIN HYDROCHLORIDE 500 MG/1
TABLET, EXTENDED RELEASE ORAL
Qty: 60 TABLET | Refills: 11 | Status: SHIPPED | OUTPATIENT
Start: 2019-04-30 | End: 2020-04-07 | Stop reason: SDUPTHER

## 2019-04-30 NOTE — PROGRESS NOTES
Patient ID: Ivana Blair is a 34 y.o. female.  Patient is here for follow up        Chief Complaint: Follow-up      HPI    Consultation requested by Dr. Steph Thompson  Date of initial consultation with me:  August 2, 2018  HPI  Patient with long history of hyperprolactinemia and history of pituitary macroadenoma.  She has seen multiple endocrinologists in the past according to records and the last time she saw an endocrinologist prior to seeing me was Dr. Perla Carlos in 2016.  She was diagnosed according to records around 2001 with hyperprolactinemia and at that time the lesion might have been measured 1.3 cm but unfortunately she could not tolerate bromocriptine nor cabergoline.  She reports remembering prolactin levels as high as the 900s in the past however.  She states bromocriptine caused strange dreams as well as other side effects and the cabergoline causes significant drowsiness that affects her ability to work but she does realize that her prolactin levels have greatly increased and she was willing to try this 1 again    She did have a transsphenoidal pituitary resection performed by Dr. Wayne-February 27, 2015 as there is an MRI that was done the next day after surgery under care everywhere and shows postoperative changes.  Unfortunately her prolactin level remained elevated after surgery and gradually increased and she essentially has had amenorrhea for many years  LMP 2002       I repeated her MRI of her pituitary which she had October 2018 and it showed recurrence of pituitary tumor measuring 1.4 cm and she had an ophthalmology evaluation and her visual fields were normal and she agreed to start back on Dostinex half tablet twice a week which she started October 2018 and she is not having significant side effects.  She does experience vivid dreams that seemed very real but other than that is tolerating it well    In the past she had mild galactorrhea with white nipple discharge mostly  when she presses her breast or she may see it on her clothing  Currently denies any headaches  Patient has a medical background as a dietician     She  had darkening of her skin consistent with acanthosis along her nasolabial folds and in her neck area that has lightened and nearly resolved  since she has continued to lose weight and she is also on extended-release metformin.  She had been more compliant but since last visit has only taken the metformin 2 times a week because she states she has a bad habit of not keeping up with medications.  She does states since her mood has been better her appetite and over-eating has not been as worse.    Patient reports that when she did take a visual field exam in the past prior to her surgery it was normal      In the past she reported  she has had symptoms of depression since a teenager and she remembers also having depressive symptoms when she was on the dopaminergic medication in the past but lately she has been feeling good as far as her mood.     In terms of desire for fertility she stated in the past that she does have a boyfriend but she has abstained from sexual activity for several years and does not plan to engage in sexual activity while on medication    And unfortunately she still has not yet had a menstrual cycle.    Her prolactin though is now finally normal.  She also reports when she used to see Dr. Carlos she did have a bone density.  The report is not available but I see where Dr. Carlos did order 1 April 2016 and she think she was told that she had low bone density.  She also has a history of vitamin-D deficiency.  She is not taking calcium or vitamin-D    I have reviewed the past medical, family and social history    Review of Systems   Constitutional: Negative for appetite change, fatigue, fever and unexpected weight change.   HENT: Negative for sore throat and trouble swallowing.    Eyes: Negative for visual disturbance.   Respiratory: Negative for  shortness of breath and wheezing.    Cardiovascular: Negative for chest pain, palpitations and leg swelling.   Gastrointestinal: Negative for diarrhea, nausea and vomiting.   Endocrine: Negative for cold intolerance, heat intolerance, polydipsia, polyphagia and polyuria.   Genitourinary: Negative for difficulty urinating, dysuria and menstrual problem.   Musculoskeletal: Negative for arthralgias and joint swelling.   Skin: Negative for rash.   Neurological: Negative for dizziness, weakness, numbness and headaches.   Psychiatric/Behavioral: Negative for confusion, dysphoric mood and sleep disturbance.       Objective:      Physical Exam   Constitutional: No distress.   HENT:   Head: Normocephalic and atraumatic.   Eyes: Conjunctivae are normal.   Musculoskeletal: She exhibits no edema.   Neurological: She is alert. No sensory deficit.        Skin: Skin is warm and dry. No rash noted. She is not diaphoretic. No erythema.   Psychiatric: She has a normal mood and affect. Her behavior is normal.   Vitals reviewed.        Lab Review:   Lab Visit on 04/24/2019   Component Date Value    Hemoglobin A1C 04/24/2019 6.2*    Estimated Avg Glucose 04/24/2019 131     Sodium 04/24/2019 140     Potassium 04/24/2019 4.4     Chloride 04/24/2019 105     CO2 04/24/2019 28     Glucose 04/24/2019 88     BUN, Bld 04/24/2019 14     Creatinine 04/24/2019 1.0     Calcium 04/24/2019 9.6     Anion Gap 04/24/2019 7*    eGFR if African American 04/24/2019 >60.0     eGFR if non  Amer* 04/24/2019 >60.0     Prolactin 04/24/2019 24.1     Estradiol 04/24/2019 16     Follicle Stimulating Hor* 04/24/2019 0.30     LH 04/24/2019 0.1     TSH 04/24/2019 1.386     Free T4 04/24/2019 0.83     T3, Free 04/24/2019 2.4     WBC 04/24/2019 7.96     RBC 04/24/2019 4.37     Hemoglobin 04/24/2019 11.7*    Hematocrit 04/24/2019 38.3     Mean Corpuscular Volume 04/24/2019 88     Mean Corpuscular Hemoglo* 04/24/2019 26.8*    Mean  Corpuscular Hemoglo* 04/24/2019 30.5*    RDW 04/24/2019 14.8*    Platelets 04/24/2019 268     MPV 04/24/2019 11.7     Immature Granulocytes 04/24/2019 0.4     Gran # (ANC) 04/24/2019 4.4     Immature Grans (Abs) 04/24/2019 0.03     Lymph # 04/24/2019 2.8     Mono # 04/24/2019 0.5     Eos # 04/24/2019 0.2     Baso # 04/24/2019 0.04     nRBC 04/24/2019 0     Gran% 04/24/2019 55.5     Lymph% 04/24/2019 34.7     Mono% 04/24/2019 6.5     Eosinophil% 04/24/2019 2.4     Basophil% 04/24/2019 0.5     Differential Method 04/24/2019 Automated     Cholesterol 04/24/2019 164     Triglycerides 04/24/2019 39     HDL 04/24/2019 57     LDL Cholesterol 04/24/2019 99.2     Hdl/Cholesterol Ratio 04/24/2019 34.8     Total Cholesterol/HDL Ra* 04/24/2019 2.9     Non-HDL Cholesterol 04/24/2019 107     Sodium 04/24/2019 140     Potassium 04/24/2019 4.4     Chloride 04/24/2019 105     CO2 04/24/2019 28     Glucose 04/24/2019 88     BUN, Bld 04/24/2019 14     Creatinine 04/24/2019 1.0     Calcium 04/24/2019 9.6     Total Protein 04/24/2019 7.4     Albumin 04/24/2019 3.6     Total Bilirubin 04/24/2019 0.2     Alkaline Phosphatase 04/24/2019 52*    AST 04/24/2019 19     ALT 04/24/2019 15     Anion Gap 04/24/2019 7*    eGFR if African American 04/24/2019 >60.0     eGFR if non  Amer* 04/24/2019 >60.0    Lab Visit on 01/21/2019   Component Date Value    Prolactin 01/21/2019 32.3*   Lab Visit on 11/29/2018   Component Date Value    TSH 11/29/2018 1.727     Free T4 11/29/2018 0.78     T3, Free 11/29/2018 2.7     Prolactin 11/29/2018 97.5*       Assessment:     1. Pituitary tumor  Prolactin    Estradiol    Follicle stimulating hormone    Luteinizing hormone    Basic metabolic panel    Insulin-like growth factor    Growth hormone    Hemoglobin A1c    DXA Bone Density Spine And Hip    Vitamin D   2. Hyperprolactinemia  Prolactin    Estradiol    Follicle stimulating hormone    Luteinizing hormone     Basic metabolic panel    Insulin-like growth factor    Growth hormone    Hemoglobin A1c    DXA Bone Density Spine And Hip    Vitamin D   3. Amenorrhea  DXA Bone Density Spine And Hip    Vitamin D   4. Insulin resistance  Growth hormone    Hemoglobin A1c    metFORMIN (GLUCOPHAGE-XR) 500 MG 24 hr tablet   5. Hypogonadotropic hypogonadism  DXA Bone Density Spine And Hip   6. Morbid obesity with BMI of 45.0-49.9, adult     7. Vitamin D deficiency  Vitamin D    patient's prolactin is finally normal.  Continue Dostinex.  Still has low gonadotropins an estradiol which may improve in time the longer she is on Dostinex with decreasing size of pituitary tumor.  She is interested in preserving her a eggs.  For gonadotropins and estradiol do not improve she may need fertility treatment to induce ovulation.  She would like to wait and see if her levels come up naturally    I will order a bone density to assess her Z-score.  I recommended calcium intake between food sources or pills.  She would like a liquid or chew formulation if possible for calcium and vitamin-D that she can mix in her smoothie.  I explained that these are over-the-counter but I will send to the pharmacy per her request and because I could not find a liquid she can still put pills or chewable tablets in her smoothie    I also told her she can take her metformin with her breakfast to make it easier for her to remember to take her metformin as her recent A1c did worsen.  Discussed also exercise and maintain a healthy diet    She plans to start at [employer]  Plan:   Pituitary tumor  -     Prolactin; Future; Expected date: 10/08/2019  -     Estradiol; Future; Expected date: 10/08/2019  -     Follicle stimulating hormone; Future; Expected date: 10/08/2019  -     Luteinizing hormone; Future; Expected date: 10/08/2019  -     Basic metabolic panel; Future; Expected date: 10/08/2019  -     Insulin-like growth factor; Future; Expected date: 10/08/2019  -      Growth hormone; Future; Expected date: 10/08/2019  -     Hemoglobin A1c; Future; Expected date: 10/08/2019  -     DXA Bone Density Spine And Hip; Future; Expected date: 04/30/2019  -     Vitamin D; Future; Expected date: 04/30/2019    Hyperprolactinemia  -     Prolactin; Future; Expected date: 10/08/2019  -     Estradiol; Future; Expected date: 10/08/2019  -     Follicle stimulating hormone; Future; Expected date: 10/08/2019  -     Luteinizing hormone; Future; Expected date: 10/08/2019  -     Basic metabolic panel; Future; Expected date: 10/08/2019  -     Insulin-like growth factor; Future; Expected date: 10/08/2019  -     Growth hormone; Future; Expected date: 10/08/2019  -     Hemoglobin A1c; Future; Expected date: 10/08/2019  -     DXA Bone Density Spine And Hip; Future; Expected date: 04/30/2019  -     Vitamin D; Future; Expected date: 04/30/2019    Amenorrhea  -     DXA Bone Density Spine And Hip; Future; Expected date: 04/30/2019  -     Vitamin D; Future; Expected date: 04/30/2019    Insulin resistance  -     Growth hormone; Future; Expected date: 10/08/2019  -     Hemoglobin A1c; Future; Expected date: 10/08/2019  -     metFORMIN (GLUCOPHAGE-XR) 500 MG 24 hr tablet; Take 2 tabs daily with a meal  Dispense: 60 tablet; Refill: 11    Hypogonadotropic hypogonadism  -     DXA Bone Density Spine And Hip; Future; Expected date: 04/30/2019    Morbid obesity with BMI of 45.0-49.9, adult    Vitamin D deficiency  -     Vitamin D; Future; Expected date: 04/30/2019          Follow up in about 6 months (around 10/30/2019).    Labs prior to appointment? yes     Disclaimer:  This note may have been partially prepared using voice recognition software and  it may have not been extensively proofed, as such there could be errors within the text such as sound alike errors.

## 2019-05-01 ENCOUNTER — PATIENT MESSAGE (OUTPATIENT)
Dept: ENDOCRINOLOGY | Facility: CLINIC | Age: 34
End: 2019-05-01

## 2019-05-02 PROBLEM — E66.01 MORBID OBESITY WITH BMI OF 45.0-49.9, ADULT: Status: ACTIVE | Noted: 2019-05-02

## 2019-05-02 PROBLEM — E88.819 INSULIN RESISTANCE: Status: ACTIVE | Noted: 2019-05-02

## 2019-05-03 ENCOUNTER — OFFICE VISIT (OUTPATIENT)
Dept: INTERNAL MEDICINE | Facility: CLINIC | Age: 34
End: 2019-05-03
Payer: COMMERCIAL

## 2019-05-03 VITALS
TEMPERATURE: 97 F | HEART RATE: 74 BPM | SYSTOLIC BLOOD PRESSURE: 128 MMHG | BODY MASS INDEX: 44.1 KG/M2 | DIASTOLIC BLOOD PRESSURE: 78 MMHG | HEIGHT: 67 IN | WEIGHT: 281 LBS

## 2019-05-03 DIAGNOSIS — Z00.00 ANNUAL PHYSICAL EXAM: Primary | ICD-10-CM

## 2019-05-03 PROCEDURE — 99999 PR PBB SHADOW E&M-EST. PATIENT-LVL III: ICD-10-PCS | Mod: PBBFAC,,, | Performed by: FAMILY MEDICINE

## 2019-05-03 PROCEDURE — 99999 PR PBB SHADOW E&M-EST. PATIENT-LVL III: CPT | Mod: PBBFAC,,, | Performed by: FAMILY MEDICINE

## 2019-05-03 PROCEDURE — 99395 PR PREVENTIVE VISIT,EST,18-39: ICD-10-PCS | Mod: S$GLB,,, | Performed by: FAMILY MEDICINE

## 2019-05-03 PROCEDURE — 99395 PREV VISIT EST AGE 18-39: CPT | Mod: S$GLB,,, | Performed by: FAMILY MEDICINE

## 2019-05-03 NOTE — PROGRESS NOTES
"Subjective:      Patient ID: Ivana Blair is a 34 y.o. female.    Chief Complaint: Annual Exam    HPI  33 yo female followed by End for prolactinoma/pituitary tumor here for annual.  Pt has dropped 14 lbs since our last visit.  She is taking metformin.  Trying to eat better and exercise.  She is fatigued, but otherwise feeling well.    Past Medical History:   Diagnosis Date    Allergy     sinus    Anxiety     B12 deficiency     Benign prolactinoma     s/p surgery    Depression     Hx of varicella     Left wrist pain     Dr. Lam    Pituitary tumor     Vitamin D deficiency      Family History   Problem Relation Age of Onset    Anemia Mother     Gout Father     Cancer Maternal Grandfather         prostate    Cataracts Maternal Grandfather     Rheumatologic disease Paternal Grandmother     Cancer Paternal Grandfather         lung    Anemia Sister     Heart disease Maternal Aunt     Alzheimer's disease Maternal Grandmother      Past Surgical History:   Procedure Laterality Date    ADENOIDECTOMY      2007    NASAL JOSE MIGUEL BULLOSA RESECTION      2013    TONSILLECTOMY      2007    TUMOR REMOVAL  02/25/2015    Prolactinoma resected by [facility]     Social History     Tobacco Use    Smoking status: Never Smoker    Smokeless tobacco: Never Used   Substance Use Topics    Alcohol use: No     Alcohol/week: 0.0 oz     Frequency: Never     Drinks per session: Patient refused     Binge frequency: Never    Drug use: No       /78   Pulse 74   Temp 97.1 °F (36.2 °C) (Tympanic)   Ht 5' 6.5" (1.689 m)   Wt 127.5 kg (281 lb)   BMI 44.68 kg/m²     Review of Systems   Constitutional: Positive for activity change. Negative for appetite change, chills, diaphoresis, fatigue, fever and unexpected weight change.   HENT: Negative for ear pain, hearing loss, postnasal drip, rhinorrhea, tinnitus and trouble swallowing.    Eyes: Negative for discharge and visual disturbance.   Respiratory: Negative for " cough, chest tightness, shortness of breath and wheezing.    Cardiovascular: Negative for chest pain, palpitations and leg swelling.   Gastrointestinal: Negative for abdominal distention, blood in stool, constipation, diarrhea and vomiting.   Endocrine: Negative for polydipsia and polyuria.   Genitourinary: Negative for difficulty urinating, dysuria, frequency, hematuria, menstrual problem and urgency.   Musculoskeletal: Positive for arthralgias. Negative for back pain, joint swelling and neck pain.   Neurological: Positive for weakness. Negative for headaches.   Hematological: Negative for adenopathy.   Psychiatric/Behavioral: Negative for confusion, decreased concentration and dysphoric mood.       Objective:     Physical Exam   Constitutional: She is oriented to person, place, and time. She appears well-developed and well-nourished. No distress.   HENT:   Right Ear: External ear normal.   Left Ear: External ear normal.   Nose: Nose normal.   Mouth/Throat: Oropharynx is clear and moist.   Eyes: Pupils are equal, round, and reactive to light. Conjunctivae are normal.   Neck: Normal range of motion. Neck supple. Carotid bruit is not present.   Cardiovascular: Normal rate, regular rhythm and normal heart sounds.   No murmur heard.  Pulmonary/Chest: Effort normal and breath sounds normal. No respiratory distress. She has no wheezes. She has no rales.   Abdominal: Soft. Bowel sounds are normal. She exhibits no distension. There is no tenderness. There is no guarding.   Musculoskeletal: She exhibits no edema.   Neurological: She is alert and oriented to person, place, and time. No cranial nerve deficit.   Skin: Skin is warm and dry. No rash noted.   Psychiatric: She has a normal mood and affect. Her behavior is normal. Judgment and thought content normal.   Nursing note and vitals reviewed.      Lab Results   Component Value Date    WBC 7.96 04/24/2019    HGB 11.7 (L) 04/24/2019    HCT 38.3 04/24/2019      04/24/2019    CHOL 164 04/24/2019    TRIG 39 04/24/2019    HDL 57 04/24/2019    ALT 15 04/24/2019    AST 19 04/24/2019     04/24/2019     04/24/2019    K 4.4 04/24/2019    K 4.4 04/24/2019     04/24/2019     04/24/2019    CREATININE 1.0 04/24/2019    CREATININE 1.0 04/24/2019    BUN 14 04/24/2019    BUN 14 04/24/2019    CO2 28 04/24/2019    CO2 28 04/24/2019    TSH 1.386 04/24/2019    INR 0.9 01/08/2015    HGBA1C 6.2 (H) 04/24/2019       Assessment:     1. Annual physical exam         Plan:     Annual physical exam    Cont current med regimen  Add daily Flinstones with iron/2 tabs daily  Work on cont healthy diet and exercise geared towards weight loss  F/u 6 mos for check on weight  Cont f/u with endo

## 2019-05-06 ENCOUNTER — PATIENT MESSAGE (OUTPATIENT)
Dept: ENDOCRINOLOGY | Facility: CLINIC | Age: 34
End: 2019-05-06

## 2019-05-06 ENCOUNTER — PATIENT MESSAGE (OUTPATIENT)
Dept: INTERNAL MEDICINE | Facility: CLINIC | Age: 34
End: 2019-05-06

## 2019-05-06 ENCOUNTER — APPOINTMENT (OUTPATIENT)
Dept: RADIOLOGY | Facility: HOSPITAL | Age: 34
End: 2019-05-06
Attending: INTERNAL MEDICINE
Payer: COMMERCIAL

## 2019-05-06 DIAGNOSIS — N91.2 AMENORRHEA: ICD-10-CM

## 2019-05-06 DIAGNOSIS — D49.7 PITUITARY TUMOR: ICD-10-CM

## 2019-05-06 DIAGNOSIS — E22.1 HYPERPROLACTINEMIA: ICD-10-CM

## 2019-05-06 DIAGNOSIS — E23.0 HYPOGONADOTROPIC HYPOGONADISM: ICD-10-CM

## 2019-05-06 PROCEDURE — 77080 DEXA BONE DENSITY SPINE HIP: ICD-10-PCS | Mod: 26,,, | Performed by: RADIOLOGY

## 2019-05-06 PROCEDURE — 77080 DXA BONE DENSITY AXIAL: CPT | Mod: TC

## 2019-05-06 PROCEDURE — 77080 DXA BONE DENSITY AXIAL: CPT | Mod: 26,,, | Performed by: RADIOLOGY

## 2019-05-08 ENCOUNTER — PATIENT MESSAGE (OUTPATIENT)
Dept: ENDOCRINOLOGY | Facility: CLINIC | Age: 34
End: 2019-05-08

## 2019-07-08 RX ORDER — CHOLECALCIFEROL (VITAMIN D3) 25 MCG
1000 TABLET ORAL DAILY
Qty: 30 TABLET | Refills: 11 | Status: SHIPPED | OUTPATIENT
Start: 2019-07-08 | End: 2020-04-07 | Stop reason: SDUPTHER

## 2019-07-08 RX ORDER — CALCIUM CARBONATE 500(1250)
1 TABLET ORAL 2 TIMES DAILY WITH MEALS
Qty: 60 TABLET | Refills: 11 | Status: SHIPPED | OUTPATIENT
Start: 2019-07-08 | End: 2020-04-07 | Stop reason: SDUPTHER

## 2019-08-08 ENCOUNTER — PATIENT MESSAGE (OUTPATIENT)
Dept: INTERNAL MEDICINE | Facility: CLINIC | Age: 34
End: 2019-08-08

## 2019-09-25 RX ORDER — CABERGOLINE 0.5 MG/1
TABLET ORAL
Qty: 12 TABLET | Refills: 0 | Status: SHIPPED | OUTPATIENT
Start: 2019-09-25 | End: 2019-11-26 | Stop reason: SDUPTHER

## 2019-10-17 ENCOUNTER — PATIENT MESSAGE (OUTPATIENT)
Dept: ENDOCRINOLOGY | Facility: CLINIC | Age: 34
End: 2019-10-17

## 2019-11-26 RX ORDER — CABERGOLINE 0.5 MG/1
TABLET ORAL
Qty: 12 TABLET | Refills: 0 | Status: SHIPPED | OUTPATIENT
Start: 2019-11-26 | End: 2020-04-07 | Stop reason: SDUPTHER

## 2020-03-16 ENCOUNTER — PATIENT MESSAGE (OUTPATIENT)
Dept: ENDOCRINOLOGY | Facility: CLINIC | Age: 35
End: 2020-03-16

## 2020-03-23 ENCOUNTER — PATIENT MESSAGE (OUTPATIENT)
Dept: INTERNAL MEDICINE | Facility: CLINIC | Age: 35
End: 2020-03-23

## 2020-03-24 ENCOUNTER — OFFICE VISIT (OUTPATIENT)
Dept: INTERNAL MEDICINE | Facility: CLINIC | Age: 35
End: 2020-03-24
Payer: COMMERCIAL

## 2020-03-24 DIAGNOSIS — J32.9 SINUSITIS, UNSPECIFIED CHRONICITY, UNSPECIFIED LOCATION: Primary | ICD-10-CM

## 2020-03-24 PROCEDURE — 99213 PR OFFICE/OUTPT VISIT, EST, LEVL III, 20-29 MIN: ICD-10-PCS | Mod: 95,,, | Performed by: FAMILY MEDICINE

## 2020-03-24 PROCEDURE — 99213 OFFICE O/P EST LOW 20 MIN: CPT | Mod: 95,,, | Performed by: FAMILY MEDICINE

## 2020-03-24 RX ORDER — FLUTICASONE PROPIONATE 50 MCG
2 SPRAY, SUSPENSION (ML) NASAL DAILY
Qty: 16 G | Refills: 0 | Status: SHIPPED | OUTPATIENT
Start: 2020-03-24 | End: 2020-04-08 | Stop reason: SDUPTHER

## 2020-03-24 NOTE — PROGRESS NOTES
Subjective:      Patient ID: Ivana Blair is a 35 y.o. female.    Chief Complaint: Sinus symptoms    HPI  34 yo presents via telemed with some sinus/allergy type symptoms she thinks, but co-worker is sick and although was not tested for COVID 19 was told it probably was.  Pt has some dizziness/movement.  Has some ear discomfort/L sided.  No fever and no respiratory symptoms.  Her father gave her an allergy pill and for that day she felt good/normal.  She is not taking any meds right now.    The patient location is: Home  The chief complaint leading to consultation is: sinus symptoms  Visit type: Virtual visit with synchronous audio and video  Total time spent with patient: 10 mins  Each patient to whom he or she provides medical services by telemedicine is:  (1) informed of the relationship between the physician and patient and the respective role of any other health care provider with respect to management of the patient; and (2) notified that he or she may decline to receive medical services by telemedicine and may withdraw from such care at any time.    Past Medical History:   Diagnosis Date    Allergy     sinus    Anxiety     B12 deficiency     Benign prolactinoma     s/p surgery    Depression     Hx of varicella     Left wrist pain     Dr. Lam    Pituitary tumor     Vitamin D deficiency      Family History   Problem Relation Age of Onset    Anemia Mother     Gout Father     Cancer Maternal Grandfather         prostate    Cataracts Maternal Grandfather     Rheumatologic disease Paternal Grandmother     Cancer Paternal Grandfather         lung    Anemia Sister     Heart disease Maternal Aunt     Alzheimer's disease Maternal Grandmother      Past Surgical History:   Procedure Laterality Date    ADENOIDECTOMY      2007    NASAL JOSE MIGUEL BULLOSA RESECTION      2013    TONSILLECTOMY      2007    TUMOR REMOVAL  02/25/2015    Prolactinoma resected by [facility]     Social History      Tobacco Use    Smoking status: Never Smoker    Smokeless tobacco: Never Used   Substance Use Topics    Alcohol use: No     Alcohol/week: 0.0 standard drinks     Frequency: Never     Drinks per session: Patient refused     Binge frequency: Never    Drug use: No       There were no vitals taken for this visit.    Review of Systems   Constitutional: Negative for chills and fever.   HENT: Positive for congestion, ear discharge, ear pain and sore throat. Negative for drooling and trouble swallowing.    Respiratory: Negative for cough, chest tightness, shortness of breath, wheezing and stridor.    Gastrointestinal: Positive for diarrhea. Negative for abdominal pain, nausea and vomiting.   Musculoskeletal: Positive for neck pain.   Neurological: Positive for dizziness and headaches.       Objective:     Physical Exam   Constitutional: She is oriented to person, place, and time. She appears well-developed and well-nourished. No distress.   Pulmonary/Chest: Effort normal. No respiratory distress.   Neurological: She is alert and oriented to person, place, and time.   Skin: She is not diaphoretic.   Psychiatric: She has a normal mood and affect. Her behavior is normal. Judgment and thought content normal.       Lab Results   Component Value Date    WBC 7.96 04/24/2019    HGB 11.7 (L) 04/24/2019    HCT 38.3 04/24/2019     04/24/2019    CHOL 164 04/24/2019    TRIG 39 04/24/2019    HDL 57 04/24/2019    ALT 15 04/24/2019    AST 19 04/24/2019     04/24/2019     04/24/2019    K 4.4 04/24/2019    K 4.4 04/24/2019     04/24/2019     04/24/2019    CREATININE 1.0 04/24/2019    CREATININE 1.0 04/24/2019    BUN 14 04/24/2019    BUN 14 04/24/2019    CO2 28 04/24/2019    CO2 28 04/24/2019    TSH 1.386 04/24/2019    INR 0.9 01/08/2015    HGBA1C 6.2 (H) 04/24/2019       Assessment:     1. Sinusitis, unspecified chronicity, unspecified location         Plan:     Sinusitis, unspecified chronicity,  unspecified location    Other orders  -     fluticasone propionate (FLONASE) 50 mcg/actuation nasal spray; 2 sprays (100 mcg total) by Each Nostril route once daily.  Dispense: 16 g; Refill: 0    Seems more consistent with sinus/allergies since the one day she used allergy med she felt well.  Start flonase/start antihistamine  Standard precautions//missed yesterday and today.  Ok to return to work tomorrow as long as fever free.  F/u PRN

## 2020-03-25 ENCOUNTER — PATIENT MESSAGE (OUTPATIENT)
Dept: INTERNAL MEDICINE | Facility: CLINIC | Age: 35
End: 2020-03-25

## 2020-03-25 ENCOUNTER — TELEPHONE (OUTPATIENT)
Dept: INTERNAL MEDICINE | Facility: CLINIC | Age: 35
End: 2020-03-25

## 2020-03-25 NOTE — TELEPHONE ENCOUNTER
Pt called and said that she saw Dr. Bishop for video visit.  She said that her work is allowing those who have immuo compromised systems to take 2 week leave, if they have 's letter. She has this plus a brain tumor and she is congested.  She would like to do this if you can provide letter.  Addressed to [employer]

## 2020-03-25 NOTE — TELEPHONE ENCOUNTER
Called pt and let her know that Dr. Bishop said she doesn't qualify for being immunocompromised.

## 2020-04-06 ENCOUNTER — PATIENT MESSAGE (OUTPATIENT)
Dept: ENDOCRINOLOGY | Facility: CLINIC | Age: 35
End: 2020-04-06

## 2020-04-07 ENCOUNTER — PATIENT MESSAGE (OUTPATIENT)
Dept: ENDOCRINOLOGY | Facility: CLINIC | Age: 35
End: 2020-04-07

## 2020-04-07 DIAGNOSIS — E88.819 INSULIN RESISTANCE: ICD-10-CM

## 2020-04-07 RX ORDER — CALCIUM CARBONATE 500(1250)
1 TABLET ORAL 2 TIMES DAILY WITH MEALS
Qty: 60 TABLET | Refills: 11 | Status: SHIPPED | OUTPATIENT
Start: 2020-04-07 | End: 2020-04-08 | Stop reason: SDUPTHER

## 2020-04-07 RX ORDER — METFORMIN HYDROCHLORIDE 500 MG/1
TABLET, EXTENDED RELEASE ORAL
Qty: 60 TABLET | Refills: 11 | Status: SHIPPED | OUTPATIENT
Start: 2020-04-07 | End: 2020-04-08 | Stop reason: SDUPTHER

## 2020-04-07 RX ORDER — CABERGOLINE 0.5 MG/1
TABLET ORAL
Qty: 12 TABLET | Refills: 1 | Status: SHIPPED | OUTPATIENT
Start: 2020-04-07 | End: 2020-04-08 | Stop reason: SDUPTHER

## 2020-04-07 RX ORDER — CHOLECALCIFEROL (VITAMIN D3) 25 MCG
1000 TABLET ORAL DAILY
Qty: 30 TABLET | Refills: 11 | Status: SHIPPED | OUTPATIENT
Start: 2020-04-07 | End: 2020-04-08 | Stop reason: SDUPTHER

## 2020-04-08 DIAGNOSIS — E88.819 INSULIN RESISTANCE: ICD-10-CM

## 2020-04-08 RX ORDER — CALCIUM CARBONATE 500(1250)
1 TABLET ORAL 2 TIMES DAILY WITH MEALS
Qty: 60 TABLET | Refills: 11 | Status: SHIPPED | OUTPATIENT
Start: 2020-04-08 | End: 2021-01-11

## 2020-04-08 RX ORDER — CHOLECALCIFEROL (VITAMIN D3) 25 MCG
1000 TABLET ORAL DAILY
Qty: 30 TABLET | Refills: 11 | Status: SHIPPED | OUTPATIENT
Start: 2020-04-08 | End: 2021-01-11

## 2020-04-08 RX ORDER — CABERGOLINE 0.5 MG/1
TABLET ORAL
Qty: 12 TABLET | Refills: 1 | Status: SHIPPED | OUTPATIENT
Start: 2020-04-08 | End: 2020-08-10

## 2020-04-08 RX ORDER — FLUTICASONE PROPIONATE 50 MCG
2 SPRAY, SUSPENSION (ML) NASAL DAILY
Qty: 16 G | Refills: 0 | Status: SHIPPED | OUTPATIENT
Start: 2020-04-08 | End: 2020-08-11

## 2020-04-08 RX ORDER — METFORMIN HYDROCHLORIDE 500 MG/1
TABLET, EXTENDED RELEASE ORAL
Qty: 60 TABLET | Refills: 11 | Status: SHIPPED | OUTPATIENT
Start: 2020-04-08 | End: 2021-04-12

## 2020-04-24 ENCOUNTER — PATIENT MESSAGE (OUTPATIENT)
Dept: ENDOCRINOLOGY | Facility: CLINIC | Age: 35
End: 2020-04-24

## 2020-06-29 DIAGNOSIS — D49.7 PITUITARY TUMOR: ICD-10-CM

## 2020-06-29 DIAGNOSIS — E22.1 HYPERPROLACTINEMIA: Primary | ICD-10-CM

## 2020-06-29 DIAGNOSIS — E88.819 INSULIN RESISTANCE: ICD-10-CM

## 2020-06-30 ENCOUNTER — LAB VISIT (OUTPATIENT)
Dept: LAB | Facility: HOSPITAL | Age: 35
End: 2020-06-30
Attending: INTERNAL MEDICINE
Payer: COMMERCIAL

## 2020-06-30 DIAGNOSIS — D49.7 PITUITARY TUMOR: ICD-10-CM

## 2020-06-30 DIAGNOSIS — E22.1 HYPERPROLACTINEMIA: ICD-10-CM

## 2020-06-30 DIAGNOSIS — E88.819 INSULIN RESISTANCE: ICD-10-CM

## 2020-06-30 LAB
ANION GAP SERPL CALC-SCNC: 8 MMOL/L (ref 8–16)
BUN SERPL-MCNC: 12 MG/DL (ref 6–20)
CALCIUM SERPL-MCNC: 9.1 MG/DL (ref 8.7–10.5)
CHLORIDE SERPL-SCNC: 105 MMOL/L (ref 95–110)
CHOLEST SERPL-MCNC: 156 MG/DL (ref 120–199)
CHOLEST/HDLC SERPL: 2.8 {RATIO} (ref 2–5)
CO2 SERPL-SCNC: 25 MMOL/L (ref 23–29)
CREAT SERPL-MCNC: 1 MG/DL (ref 0.5–1.4)
EST. GFR  (AFRICAN AMERICAN): >60 ML/MIN/1.73 M^2
EST. GFR  (NON AFRICAN AMERICAN): >60 ML/MIN/1.73 M^2
ESTIMATED AVG GLUCOSE: 117 MG/DL (ref 68–131)
GLUCOSE SERPL-MCNC: 82 MG/DL (ref 70–110)
HBA1C MFR BLD HPLC: 5.7 % (ref 4–5.6)
HDLC SERPL-MCNC: 55 MG/DL (ref 40–75)
HDLC SERPL: 35.3 % (ref 20–50)
LDLC SERPL CALC-MCNC: 87.8 MG/DL (ref 63–159)
NONHDLC SERPL-MCNC: 101 MG/DL
POTASSIUM SERPL-SCNC: 4 MMOL/L (ref 3.5–5.1)
PROLACTIN SERPL IA-MCNC: 6.3 NG/ML (ref 5.2–26.5)
SODIUM SERPL-SCNC: 138 MMOL/L (ref 136–145)
T4 FREE SERPL-MCNC: 0.76 NG/DL (ref 0.71–1.51)
TRIGL SERPL-MCNC: 66 MG/DL (ref 30–150)
TSH SERPL DL<=0.005 MIU/L-ACNC: 2 UIU/ML (ref 0.4–4)

## 2020-06-30 PROCEDURE — 83003 ASSAY GROWTH HORMONE (HGH): CPT

## 2020-06-30 PROCEDURE — 84443 ASSAY THYROID STIM HORMONE: CPT

## 2020-06-30 PROCEDURE — 80061 LIPID PANEL: CPT

## 2020-06-30 PROCEDURE — 84146 ASSAY OF PROLACTIN: CPT

## 2020-06-30 PROCEDURE — 84305 ASSAY OF SOMATOMEDIN: CPT

## 2020-06-30 PROCEDURE — 84439 ASSAY OF FREE THYROXINE: CPT

## 2020-06-30 PROCEDURE — 83036 HEMOGLOBIN GLYCOSYLATED A1C: CPT

## 2020-06-30 PROCEDURE — 80048 BASIC METABOLIC PNL TOTAL CA: CPT

## 2020-07-02 LAB — GH SERPL-MCNC: 0.1 NG/ML (ref 0–8)

## 2020-07-08 LAB
IGF-I SERPL-MCNC: 98 NG/ML (ref 54–258)
IGF-I Z-SCORE SERPL: -0.67 SD

## 2020-08-10 ENCOUNTER — OFFICE VISIT (OUTPATIENT)
Dept: ENDOCRINOLOGY | Facility: CLINIC | Age: 35
End: 2020-08-10
Payer: COMMERCIAL

## 2020-08-10 VITALS
WEIGHT: 278 LBS | RESPIRATION RATE: 18 BRPM | HEIGHT: 67 IN | BODY MASS INDEX: 43.63 KG/M2 | SYSTOLIC BLOOD PRESSURE: 150 MMHG | DIASTOLIC BLOOD PRESSURE: 86 MMHG | HEART RATE: 57 BPM | TEMPERATURE: 98 F

## 2020-08-10 DIAGNOSIS — D49.7 PITUITARY TUMOR: ICD-10-CM

## 2020-08-10 DIAGNOSIS — N91.2 AMENORRHEA: ICD-10-CM

## 2020-08-10 DIAGNOSIS — M85.80 OSTEOPENIA, UNSPECIFIED LOCATION: ICD-10-CM

## 2020-08-10 DIAGNOSIS — E22.1 HYPERPROLACTINEMIA: Primary | ICD-10-CM

## 2020-08-10 PROCEDURE — 3008F PR BODY MASS INDEX (BMI) DOCUMENTED: ICD-10-PCS | Mod: CPTII,S$GLB,, | Performed by: INTERNAL MEDICINE

## 2020-08-10 PROCEDURE — 3008F BODY MASS INDEX DOCD: CPT | Mod: CPTII,S$GLB,, | Performed by: INTERNAL MEDICINE

## 2020-08-10 PROCEDURE — 99999 PR PBB SHADOW E&M-EST. PATIENT-LVL III: CPT | Mod: PBBFAC,,, | Performed by: INTERNAL MEDICINE

## 2020-08-10 PROCEDURE — 99214 PR OFFICE/OUTPT VISIT, EST, LEVL IV, 30-39 MIN: ICD-10-PCS | Mod: S$GLB,,, | Performed by: INTERNAL MEDICINE

## 2020-08-10 PROCEDURE — 99999 PR PBB SHADOW E&M-EST. PATIENT-LVL III: ICD-10-PCS | Mod: PBBFAC,,, | Performed by: INTERNAL MEDICINE

## 2020-08-10 PROCEDURE — 99214 OFFICE O/P EST MOD 30 MIN: CPT | Mod: S$GLB,,, | Performed by: INTERNAL MEDICINE

## 2020-08-10 RX ORDER — AMOXICILLIN 875 MG/1
TABLET, FILM COATED ORAL
COMMUNITY
Start: 2020-07-21 | End: 2021-01-11

## 2020-08-10 RX ORDER — CABERGOLINE 0.5 MG/1
TABLET ORAL
Qty: 24 TABLET | Refills: 1 | Status: SHIPPED | OUTPATIENT
Start: 2020-08-10 | End: 2021-01-07 | Stop reason: SDUPTHER

## 2020-08-10 RX ORDER — HYDROCODONE BITARTRATE AND ACETAMINOPHEN 10; 325 MG/1; MG/1
TABLET ORAL
COMMUNITY
Start: 2020-07-21 | End: 2020-08-11

## 2020-08-10 NOTE — PROGRESS NOTES
Patient ID: Ivana Blair is a 35 y.o. female.  Patient is here for follow up        Chief Complaint: Follow-up      HPI    Consultation requested by Dr. Steph Thompson  Date of initial consultation with me:  August 2, 2018  HPI  Patient with long history of hyperprolactinemia and history of pituitary macroadenoma.  She has seen multiple endocrinologists in the past according to records and the last time she saw an endocrinologist prior to seeing me was Dr. Perla Carlos in 2016.  She was diagnosed according to records around 2001 with hyperprolactinemia and at that time the lesion might have been measured 1.3 cm but unfortunately she could not tolerate bromocriptine nor cabergoline.  She reports remembering prolactin levels as high as the 900s in the past however.  She states bromocriptine caused strange dreams as well as other side effects and the cabergoline causes significant drowsiness that affects her ability to work but she does realize that her prolactin levels have greatly increased and she was willing to try this 1 again    She did have a transsphenoidal pituitary resection performed by Dr. Wayne-February 27, 2015 as there is an MRI that was done the next day after surgery under care everywhere and shows postoperative changes.  Unfortunately her prolactin level remained elevated after surgery and gradually increased and she essentially has had amenorrhea for many years  LMP 2002       I repeated her MRI of her pituitary which she had October 2018 and it showed irregular nodular appearance of the left side of the pituitary gland as aboveand she had an ophthalmology evaluation and her visual fields were normal and she agreed to start back on Dostinex half tablet twice a week which she increased on her own to 1 tablet twice a week, which she started back on in October 2018 and she is not having significant side effects.  No reports lately of any vivid dreams or extreme fatigue, no headaches    In  the past she had mild galactorrhea with white nipple discharge mostly when she presses her breast or she may see it on her clothing    Patient has a medical background as a dietician     She  had darkening of her skin consistent with acanthosis along her nasolabial folds and in her neck area that has lightened and nearly resolved  since she has continued to lose weight and she is also on extended-release metformin.        She does states since her mood has been better her appetite and over-eating has not been as worse.    She still has not had a return of menstrual cycle, in the past her gynecologist did try her on progesterone on but she had no bleeding    Patient reports that when she did take a visual field exam in the past prior to her surgery it was normal      In the past she reported  she has had symptoms of depression since a teenager and she remembers also having depressive symptoms when she was on the dopaminergic medication in the past but lately she has been feeling good as far as her mood.     In terms of desire for fertility she stated again that she does have a boyfriend but she has abstained from sexual activity for several years and does not plan to engage in sexual activity while on medication      Recent prolactin level again normal    I did a bone density after last visit in 2019 and it did show a lower than expected disease score of -2.4 and I recommended calcium supplements and vitamin-D    I have reviewed the past medical, family and social history    Review of Systems   Constitutional: Negative for appetite change, fatigue, fever and unexpected weight change.   HENT: Negative for sore throat and trouble swallowing.    Eyes: Negative for visual disturbance.   Respiratory: Negative for shortness of breath and wheezing.    Cardiovascular: Negative for chest pain, palpitations and leg swelling.   Gastrointestinal: Negative for diarrhea, nausea and vomiting.   Endocrine: Negative for cold  intolerance, heat intolerance, polydipsia, polyphagia and polyuria.   Genitourinary: Negative for difficulty urinating, dysuria and menstrual problem.   Musculoskeletal: Negative for arthralgias and joint swelling.   Skin: Negative for rash.   Neurological: Negative for dizziness, weakness, numbness and headaches.   Psychiatric/Behavioral: Negative for confusion, dysphoric mood and sleep disturbance.       Objective:      Physical Exam   Constitutional: No distress.   HENT:   Head: Normocephalic and atraumatic.   Eyes: Conjunctivae are normal.   Musculoskeletal: She exhibits no edema.   Neurological: She is alert. No sensory deficit.        Skin: Skin is warm and dry. No rash noted. She is not diaphoretic. No erythema.   Psychiatric: She has a normal mood and affect. Her behavior is normal.   Vitals reviewed.        Lab Review:   Lab Visit on 06/30/2020   Component Date Value    Prolactin 06/30/2020 6.3     TSH 06/30/2020 1.996     Free T4 06/30/2020 0.76     Growth Hormone 06/30/2020 0.1     Somatomedin (IGF-I) 06/30/2020 98     Z Score 06/30/2020 -0.67     Sodium 06/30/2020 138     Potassium 06/30/2020 4.0     Chloride 06/30/2020 105     CO2 06/30/2020 25     Glucose 06/30/2020 82     BUN, Bld 06/30/2020 12     Creatinine 06/30/2020 1.0     Calcium 06/30/2020 9.1     Anion Gap 06/30/2020 8     eGFR if African American 06/30/2020 >60.0     eGFR if non African Amer* 06/30/2020 >60.0     Hemoglobin A1C 06/30/2020 5.7*    Estimated Avg Glucose 06/30/2020 117     Cholesterol 06/30/2020 156     Triglycerides 06/30/2020 66     HDL 06/30/2020 55     LDL Cholesterol 06/30/2020 87.8     Hdl/Cholesterol Ratio 06/30/2020 35.3     Total Cholesterol/HDL Ra* 06/30/2020 2.8     Non-HDL Cholesterol 06/30/2020 101        Assessment:     1. Hyperprolactinemia  Basic metabolic panel    Estradiol    Follicle stimulating hormone    Luteinizing hormone    MRI Brain W WO Contrast    Alpha-Subunit Pituitary Tumor  Marker   2. Pituitary tumor  Basic metabolic panel    Estradiol    Follicle stimulating hormone    Luteinizing hormone    MRI Brain W WO Contrast    Alpha-Subunit Pituitary Tumor Marker   3. Amenorrhea     4. Osteopenia, unspecified location      Continue Dostinex, repeat [place] troponins and estradiol to see if any improvement, she continues with amenorrhea, unsure if this is going to be permanent , repeat MRI for follow-up of pituitary adenoma      Recommend continue calcium and vitamin-D supplementation      Plan:   Hyperprolactinemia  -     Basic metabolic panel; Future; Expected date: 08/10/2020  -     Estradiol; Future; Expected date: 08/10/2020  -     Follicle stimulating hormone; Future; Expected date: 08/10/2020  -     Luteinizing hormone; Future; Expected date: 08/10/2020  -     MRI Brain W WO Contrast; Future; Expected date: 08/10/2020  -     Alpha-Subunit Pituitary Tumor Marker; Future; Expected date: 08/10/2020    Pituitary tumor  -     Basic metabolic panel; Future; Expected date: 08/10/2020  -     Estradiol; Future; Expected date: 08/10/2020  -     Follicle stimulating hormone; Future; Expected date: 08/10/2020  -     Luteinizing hormone; Future; Expected date: 08/10/2020  -     MRI Brain W WO Contrast; Future; Expected date: 08/10/2020  -     Alpha-Subunit Pituitary Tumor Marker; Future; Expected date: 08/10/2020    Amenorrhea    Osteopenia, unspecified location    Other orders  -     cabergoline (DOSTINEX) 0.5 mg tablet; Take one  tablet twice a week  Dispense: 24 tablet; Refill: 1          No follow-ups on file.    Labs prior to appointment? yes     Disclaimer:  This note may have been partially prepared using voice recognition software and  it may have not been extensively proofed, as such there could be errors within the text such as sound alike errors.

## 2020-08-11 ENCOUNTER — OFFICE VISIT (OUTPATIENT)
Dept: INTERNAL MEDICINE | Facility: CLINIC | Age: 35
End: 2020-08-11
Payer: COMMERCIAL

## 2020-08-11 ENCOUNTER — PATIENT MESSAGE (OUTPATIENT)
Dept: OBSTETRICS AND GYNECOLOGY | Facility: CLINIC | Age: 35
End: 2020-08-11

## 2020-08-11 ENCOUNTER — TELEPHONE (OUTPATIENT)
Dept: OBSTETRICS AND GYNECOLOGY | Facility: CLINIC | Age: 35
End: 2020-08-11

## 2020-08-11 VITALS
WEIGHT: 277.31 LBS | HEIGHT: 67 IN | DIASTOLIC BLOOD PRESSURE: 82 MMHG | BODY MASS INDEX: 43.53 KG/M2 | HEART RATE: 64 BPM | SYSTOLIC BLOOD PRESSURE: 122 MMHG | TEMPERATURE: 99 F

## 2020-08-11 DIAGNOSIS — Z00.00 ANNUAL PHYSICAL EXAM: Primary | ICD-10-CM

## 2020-08-11 PROCEDURE — 99999 PR PBB SHADOW E&M-EST. PATIENT-LVL III: ICD-10-PCS | Mod: PBBFAC,,, | Performed by: FAMILY MEDICINE

## 2020-08-11 PROCEDURE — 99395 PREV VISIT EST AGE 18-39: CPT | Mod: S$GLB,,, | Performed by: FAMILY MEDICINE

## 2020-08-11 PROCEDURE — 99395 PR PREVENTIVE VISIT,EST,18-39: ICD-10-PCS | Mod: S$GLB,,, | Performed by: FAMILY MEDICINE

## 2020-08-11 PROCEDURE — 99999 PR PBB SHADOW E&M-EST. PATIENT-LVL III: CPT | Mod: PBBFAC,,, | Performed by: FAMILY MEDICINE

## 2020-08-11 NOTE — PROGRESS NOTES
"  Subjective:      Patient ID: Ivana Blair is a 35 y.o. female.    Chief Complaint: Annual Exam    HPI  36 yo with hx of prolactinoma, insulin resistance, vit D def,obesity here for annual.  Followed by Dr. Toribio/Fernando  Overall doing well  Job is stressful/causing her a lot of issues.  Considering a job change    Past Medical History:   Diagnosis Date    Allergy     sinus    Anxiety     B12 deficiency     Benign prolactinoma     s/p surgery    Depression     Hx of varicella     Left wrist pain     Dr. Lam    Pituitary tumor     Vitamin D deficiency      Family History   Problem Relation Age of Onset    Anemia Mother     Gout Father     Cancer Maternal Grandfather         prostate    Cataracts Maternal Grandfather     Rheumatologic disease Paternal Grandmother     Cancer Paternal Grandfather         lung    Anemia Sister     Heart disease Maternal Aunt     Alzheimer's disease Maternal Grandmother      Past Surgical History:   Procedure Laterality Date    ADENOIDECTOMY      2007    DENTAL SURGERY      NASAL JOSE MIGUEL BULLOSA RESECTION      2013    TENDON RELEASE Left     wrist    TONSILLECTOMY      2007    TUMOR REMOVAL  02/25/2015    Prolactinoma resected by [facility]     Social History     Tobacco Use    Smoking status: Never Smoker    Smokeless tobacco: Never Used   Substance Use Topics    Alcohol use: No     Alcohol/week: 0.0 standard drinks     Frequency: Never     Drinks per session: Patient refused     Binge frequency: Never    Drug use: No       /80   Pulse 64   Temp 98.8 °F (37.1 °C)   Ht 5' 7" (1.702 m)   Wt 125.8 kg (277 lb 5.4 oz)   BMI 43.44 kg/m²     Review of Systems   Constitutional: Positive for activity change. Negative for unexpected weight change.   HENT: Negative for hearing loss, rhinorrhea and trouble swallowing.    Eyes: Negative for discharge and visual disturbance.   Respiratory: Negative for chest tightness and wheezing.    Cardiovascular: " Negative for chest pain and palpitations.   Gastrointestinal: Negative for blood in stool, constipation, diarrhea and vomiting.   Endocrine: Negative for polydipsia and polyuria.   Genitourinary: Positive for menstrual problem. Negative for difficulty urinating, dysuria and hematuria.   Musculoskeletal: Positive for arthralgias. Negative for joint swelling and neck pain.   Neurological: Positive for weakness and headaches.   Psychiatric/Behavioral: Positive for dysphoric mood. Negative for confusion.       Objective:     Physical Exam  Vitals signs and nursing note reviewed.   Constitutional:       General: She is not in acute distress.     Appearance: She is well-developed.   HENT:      Right Ear: External ear normal.      Left Ear: External ear normal.   Eyes:      Conjunctiva/sclera: Conjunctivae normal.      Pupils: Pupils are equal, round, and reactive to light.   Neck:      Musculoskeletal: Normal range of motion and neck supple.      Thyroid: No thyromegaly.   Cardiovascular:      Rate and Rhythm: Normal rate and regular rhythm.      Heart sounds: Normal heart sounds. No murmur.   Pulmonary:      Effort: Pulmonary effort is normal. No respiratory distress.      Breath sounds: Normal breath sounds. No wheezing or rales.   Abdominal:      General: Bowel sounds are normal. There is no distension.      Palpations: Abdomen is soft.      Tenderness: There is no abdominal tenderness. There is no guarding.   Musculoskeletal: Normal range of motion.   Skin:     General: Skin is warm and dry.      Findings: No rash.   Neurological:      Mental Status: She is alert and oriented to person, place, and time.      Cranial Nerves: No cranial nerve deficit.   Psychiatric:         Behavior: Behavior normal.         Thought Content: Thought content normal.         Judgment: Judgment normal.         Lab Results   Component Value Date    WBC 7.96 04/24/2019    HGB 11.7 (L) 04/24/2019    HCT 38.3 04/24/2019     04/24/2019     CHOL 156 06/30/2020    TRIG 66 06/30/2020    HDL 55 06/30/2020    ALT 15 04/24/2019    AST 19 04/24/2019     06/30/2020    K 4.0 06/30/2020     06/30/2020    CREATININE 1.0 06/30/2020    BUN 12 06/30/2020    CO2 25 06/30/2020    TSH 1.996 06/30/2020    INR 0.9 01/08/2015    HGBA1C 5.7 (H) 06/30/2020       Assessment:     1. Annual physical exam         Plan:     Annual physical exam    Labs overall with A1c/lipids look great  Cont per Endo  Cont meds  Focus on self care//exercise//better ways to manage stress/anxiety.  F/u annually and PRN

## 2020-08-11 NOTE — TELEPHONE ENCOUNTER
Called pt to inform that Dr. Thompson has been scheduled for surgery and we'd like to change her appointment time. Pt agreed and verbalized understanding. Appt changed to 1115 am.

## 2020-08-11 NOTE — TELEPHONE ENCOUNTER
Called patient and rescheduled appointment.  Visitor's policy and check in procedure explained and patient verbalized understanding.

## 2020-08-12 ENCOUNTER — OFFICE VISIT (OUTPATIENT)
Dept: OBSTETRICS AND GYNECOLOGY | Facility: CLINIC | Age: 35
End: 2020-08-12
Payer: COMMERCIAL

## 2020-08-12 ENCOUNTER — LAB VISIT (OUTPATIENT)
Dept: LAB | Facility: HOSPITAL | Age: 35
End: 2020-08-12
Attending: INTERNAL MEDICINE
Payer: COMMERCIAL

## 2020-08-12 VITALS
BODY MASS INDEX: 43.22 KG/M2 | SYSTOLIC BLOOD PRESSURE: 118 MMHG | WEIGHT: 275.38 LBS | DIASTOLIC BLOOD PRESSURE: 84 MMHG | HEIGHT: 67 IN

## 2020-08-12 DIAGNOSIS — N91.2 AMENORRHEA: ICD-10-CM

## 2020-08-12 DIAGNOSIS — N91.2 AMENORRHEA: Primary | ICD-10-CM

## 2020-08-12 DIAGNOSIS — Z86.018 HISTORY OF PROLACTINOMA: ICD-10-CM

## 2020-08-12 DIAGNOSIS — D49.7 PITUITARY TUMOR: ICD-10-CM

## 2020-08-12 DIAGNOSIS — Z00.00 WELL WOMAN EXAM WITHOUT GYNECOLOGICAL EXAM: ICD-10-CM

## 2020-08-12 DIAGNOSIS — D35.2 BENIGN PROLACTINOMA: ICD-10-CM

## 2020-08-12 DIAGNOSIS — E22.1 HYPERPROLACTINEMIA: ICD-10-CM

## 2020-08-12 LAB
ANION GAP SERPL CALC-SCNC: 5 MMOL/L (ref 8–16)
BUN SERPL-MCNC: 12 MG/DL (ref 6–20)
CALCIUM SERPL-MCNC: 9.7 MG/DL (ref 8.7–10.5)
CHLORIDE SERPL-SCNC: 107 MMOL/L (ref 95–110)
CO2 SERPL-SCNC: 29 MMOL/L (ref 23–29)
CREAT SERPL-MCNC: 1 MG/DL (ref 0.5–1.4)
EST. GFR  (AFRICAN AMERICAN): >60 ML/MIN/1.73 M^2
EST. GFR  (NON AFRICAN AMERICAN): >60 ML/MIN/1.73 M^2
ESTRADIOL SERPL-MCNC: 23 PG/ML
FSH SERPL-ACNC: 2.9 MIU/ML
GLUCOSE SERPL-MCNC: 77 MG/DL (ref 70–110)
LH SERPL-ACNC: 1.1 MIU/ML
POTASSIUM SERPL-SCNC: 4 MMOL/L (ref 3.5–5.1)
SODIUM SERPL-SCNC: 141 MMOL/L (ref 136–145)

## 2020-08-12 PROCEDURE — 99395 PREV VISIT EST AGE 18-39: CPT | Mod: S$GLB,,, | Performed by: OBSTETRICS & GYNECOLOGY

## 2020-08-12 PROCEDURE — 83520 IMMUNOASSAY QUANT NOS NONAB: CPT

## 2020-08-12 PROCEDURE — 83520 IMMUNOASSAY QUANT NOS NONAB: CPT | Mod: 59

## 2020-08-12 PROCEDURE — 36415 COLL VENOUS BLD VENIPUNCTURE: CPT

## 2020-08-12 PROCEDURE — 99395 PR PREVENTIVE VISIT,EST,18-39: ICD-10-PCS | Mod: S$GLB,,, | Performed by: OBSTETRICS & GYNECOLOGY

## 2020-08-12 PROCEDURE — 81243 FMR1 GEN ALY DETC ABNL ALLEL: CPT

## 2020-08-12 PROCEDURE — 99999 PR PBB SHADOW E&M-EST. PATIENT-LVL III: ICD-10-PCS | Mod: PBBFAC,,, | Performed by: OBSTETRICS & GYNECOLOGY

## 2020-08-12 PROCEDURE — 82670 ASSAY OF TOTAL ESTRADIOL: CPT

## 2020-08-12 PROCEDURE — 99999 PR PBB SHADOW E&M-EST. PATIENT-LVL III: CPT | Mod: PBBFAC,,, | Performed by: OBSTETRICS & GYNECOLOGY

## 2020-08-12 PROCEDURE — 83002 ASSAY OF GONADOTROPIN (LH): CPT

## 2020-08-12 PROCEDURE — 84403 ASSAY OF TOTAL TESTOSTERONE: CPT

## 2020-08-12 PROCEDURE — 80048 BASIC METABOLIC PNL TOTAL CA: CPT

## 2020-08-12 PROCEDURE — 83001 ASSAY OF GONADOTROPIN (FSH): CPT

## 2020-08-12 NOTE — PROGRESS NOTES
1.4cm prolactinoma MRI 2018  followed by Kala  taking cabergoline.  If still no menses, will investigate further. For now, this is typical of prolactinoma for pt's to experience amenorrhea. As the cabergoline starts to works, revuewed w pt she may get ovluation before she sees a cycle, so to use back up contraception such as condoms if she becomes sexually active.        CHIEF COMPLAINT:   Gynecologic Exam  Chief Complaint   Patient presents with    Well Woman       HISTORY OF PRESENT ILLNESS  Ivana Blair  presents for annual exam. The patient has no complaints today. Declines exam as she states she would prefer no exam today. Reviewed her gyn HM needs. Since she is at low risk for cervical cancer (not sexually active and last HPV was neg), she would like to wait until next year and will do a full exam and pap.   She is not sexually active.  Contraception:  None   No complaints - no VB no hot flashes.    1.4cm prolactinoma MRI 2018 - diagnosed at 17yo when she stopped her menses  followed by Kala  taking cabergoline.  She still has no menses despite almost 1y of normal prolatin level.         No LMP recorded. (Menstrual status: Other).    GYN screening history: last Pap: was normal. Never had any abnormal Pap smears in past.       Health Maintenance   Topic Date Due    Hepatitis C Screening  1985    TETANUS VACCINE  2023    Lipid Panel  Completed       HISTORY  Patient Active Problem List   Diagnosis    Benign prolactinoma    Non-pregnancy related A-G syndrome    Morbid obesity    Vitamin D insufficiency    Morbid obesity with BMI of 45.0-49.9, adult    Insulin resistance       Past Medical History:   Diagnosis Date    Allergy     sinus    Anxiety     B12 deficiency     Benign prolactinoma     s/p surgery    Depression     Hx of varicella     Left wrist pain     Dr. Lam    Pituitary tumor     Vitamin D deficiency        Past Surgical History:   Procedure  Laterality Date    ADENOIDECTOMY      2007    DENTAL SURGERY      NASAL JOSE MIGUEL BULLOSA RESECTION      2013    TENDON RELEASE Left     wrist    TONSILLECTOMY      2007    TUMOR REMOVAL  02/25/2015    Prolactinoma resected by Luz       Family History   Problem Relation Age of Onset    Anemia Mother     Gout Father     Cancer Maternal Grandfather         prostate    Cataracts Maternal Grandfather     Rheumatologic disease Paternal Grandmother     Cancer Paternal Grandfather         lung    Anemia Sister     Heart disease Maternal Aunt     Alzheimer's disease Maternal Grandmother        Social History     Socioeconomic History    Marital status: Single     Spouse name: Not on file    Number of children: 0    Years of education: Not on file    Highest education level: Not on file   Occupational History    Occupation: supervisor at [employer]     Comment: [employer]   Social Needs    Financial resource strain: Somewhat hard    Food insecurity     Worry: Sometimes true     Inability: Sometimes true    Transportation needs     Medical: No     Non-medical: No   Tobacco Use    Smoking status: Never Smoker    Smokeless tobacco: Never Used   Substance and Sexual Activity    Alcohol use: No     Alcohol/week: 0.0 standard drinks     Frequency: Never     Drinks per session: Patient refused     Binge frequency: Never    Drug use: No    Sexual activity: Not Currently     Partners: Male     Birth control/protection: None   Lifestyle    Physical activity     Days per week: Patient refused     Minutes per session: 40 min    Stress: To some extent   Relationships    Social connections     Talks on phone: More than three times a week     Gets together: More than three times a week     Attends Yazidism service: Not on file     Active member of club or organization: Yes     Attends meetings of clubs or organizations: 1 to 4 times per year     Relationship status: Never    Other Topics Concern    Are  "you pregnant or think you may be? No    Breast-feeding No   Social History Narrative    Wears a seatbelt.       Current Outpatient Medications   Medication Sig Dispense Refill    amoxicillin (AMOXIL) 875 MG tablet       cabergoline (DOSTINEX) 0.5 mg tablet Take one  tablet twice a week 24 tablet 1    metFORMIN (GLUCOPHAGE-XR) 500 MG XR 24hr tablet Take 2 tabs daily with a meal 60 tablet 11    calcium carbonate (OS-SAMY) 500 mg calcium (1,250 mg) tablet Take 1 tablet (500 mg total) by mouth 2 (two) times daily with meals. (Patient not taking: Reported on 8/11/2020) 60 tablet 11    vitamin D (VITAMIN D3) 1000 units Tab Take 1 tablet (1,000 Units total) by mouth once daily. (Patient not taking: Reported on 8/11/2020) 30 tablet 11     No current facility-administered medications for this visit.        Review of patient's allergies indicates:   Allergen Reactions    Pollen,fermented Itching           PHYSICAL EXAM     Vitals:    08/12/20 1152   BP: 118/84   Weight: 124.9 kg (275 lb 5.7 oz)   Height: 5' 7" (1.702 m)   PainSc: 0-No pain        PAIN SCALE: 0/10 None    ROS:  GENERAL: No fever, chills, fatigability or weight loss.  ABDOMEN: Appetite fine. No weight loss. Denies diarrhea, abdominal pain, hematemesis or blood in stool.  No change in bowel movement pattern.  URINARY: No flank pain, dysuria or hematuria.  REPRODUCTIVE: No abnormal vaginal bleeding.  BREASTS: Breasts symmetric, nontender and no lumps detected.    PE:   APPEARANCE: Well nourished, well developed, in no acute distress.         DIAGNOSIS:      1. Amenorrhea    2. History of prolactinoma    3. Benign prolactinoma    4. Well woman exam without gynecological exam        PLAN:   Orders Placed This Encounter   Procedures    US Pelvis Limited Non OB     Standing Status:   Future     Standing Expiration Date:   8/12/2021     Order Specific Question:   Is the patient pregnant?     Answer:   No    Testosterone Panel     Standing Status:   Future     " Standing Expiration Date:   8/12/2021    Antimullerian hormone (AMH)     Standing Status:   Future     Standing Expiration Date:   10/11/2021    Inhibin B     Standing Status:   Future     Standing Expiration Date:   10/11/2021    Chromosome analysis, frag x DNA     Standing Status:   Future     Standing Expiration Date:   10/11/2021     Order Specific Question:   Specimen Source     Answer:   Blood     Order Specific Question:   Reason For Referral     Answer:   amenorrhea        MEDICATIONS PRESCRIBED:   PNV         COUNSELING:  Patient was counseled today on A.C.S. Pap guidelines and recommendations for yearly pelvic exams, mammograms and monthly self breast exams; to see her PCP for other health maintenance.     FOLLOW-UP: With me for lab results and possible initiation of hormone replacement - will review w DrStory. Will also discuss fertility implications.

## 2020-08-12 NOTE — LETTER
August 12, 2020      [facility]  [address]  [place] [address]  Phone: [phone]  Fax: [phone]       Patient: Ivana Blair   YOB: 1985  Date of Visit: 08/12/2020    To Whom It May Concern:    Prieto Blair  was at [facility] on 08/12/2020. She may return to work on 8/12/2020 with no restrictions. If you have any questions or concerns, or if I can be of further assistance, please do not hesitate to contact me.    Sincerely,    Steph Thompson MD

## 2020-08-18 LAB
ALBUMIN SERPL-MCNC: 4.1 G/DL (ref 3.6–5.1)
INHIBIN B SERPL IA-MCNC: <10 PG/ML
SHBG SERPL-SCNC: 26 NMOL/L (ref 17–124)
TESTOST FREE SERPL-MCNC: 2.3 PG/ML (ref 0.2–5)
TESTOST SERPL-MCNC: 16 NG/DL (ref 2–45)
TESTOSTERONE.FREE+WB SERPL-MCNC: 4.3 NG/DL (ref 0.5–8.5)

## 2020-08-19 LAB
A-PGH SER-MCNC: 0.2 NG/ML
FMR1 GENE MUT ANL BLD/T: NORMAL
FRAGILE X MOLECULAR ANALYSIS RELEASED BY: NORMAL
FRAGILE X MOLECULAR ANALYSIS RESULT SUMMARY: NEGATIVE
FRAGILE X SPECIMEN: NORMAL
FRAGILE X, REASON FOR REFERRAL: NORMAL
GENETICIST REVIEW: NORMAL
REF LAB TEST METHOD: NORMAL
SPECIMEN SOURCE: NORMAL

## 2020-08-21 ENCOUNTER — PATIENT MESSAGE (OUTPATIENT)
Dept: OBSTETRICS AND GYNECOLOGY | Facility: CLINIC | Age: 35
End: 2020-08-21

## 2020-08-22 ENCOUNTER — TELEPHONE (OUTPATIENT)
Dept: OBSTETRICS AND GYNECOLOGY | Facility: HOSPITAL | Age: 35
End: 2020-08-22

## 2020-08-23 NOTE — TELEPHONE ENCOUNTER
Steve Garcia, between your and my labs recently looks like hormonally Ivana is normal. However she still has no menses despite almost 1y of normal prolatin level. She will have a repeat uterine ultrasound soon, but I am expecting that to be anatomically normal.   Wondering, should we consider hormonal replacement despite the way the labs look?  Shaniqua

## 2020-08-24 ENCOUNTER — TELEPHONE (OUTPATIENT)
Dept: OBSTETRICS AND GYNECOLOGY | Facility: CLINIC | Age: 35
End: 2020-08-24

## 2020-08-24 NOTE — TELEPHONE ENCOUNTER
Contacted pt to schedule u/s. Pt stated that she is currently in [place] and will call to schedule the u/s when she returns. Verbalized understanding.

## 2020-08-24 NOTE — TELEPHONE ENCOUNTER
----- Message from Steph Thompson MD sent at 8/22/2020  8:27 PM CDT -----  This pt has an u/s ordered but not scheduled. pls schedule and link, and reschedule her appt for a week after the scan is done.

## 2021-01-04 ENCOUNTER — LAB VISIT (OUTPATIENT)
Dept: LAB | Facility: HOSPITAL | Age: 36
End: 2021-01-04
Attending: FAMILY MEDICINE
Payer: MEDICAID

## 2021-01-04 ENCOUNTER — OFFICE VISIT (OUTPATIENT)
Dept: INTERNAL MEDICINE | Facility: CLINIC | Age: 36
End: 2021-01-04
Payer: MEDICAID

## 2021-01-04 VITALS
OXYGEN SATURATION: 97 % | TEMPERATURE: 97 F | BODY MASS INDEX: 43.88 KG/M2 | DIASTOLIC BLOOD PRESSURE: 88 MMHG | WEIGHT: 279.56 LBS | HEIGHT: 67 IN | SYSTOLIC BLOOD PRESSURE: 126 MMHG | HEART RATE: 97 BPM

## 2021-01-04 DIAGNOSIS — Z11.4 ENCOUNTER FOR SCREENING FOR HIV: ICD-10-CM

## 2021-01-04 DIAGNOSIS — Z11.59 ENCOUNTER FOR HEPATITIS C SCREENING TEST FOR LOW RISK PATIENT: ICD-10-CM

## 2021-01-04 DIAGNOSIS — D35.2 BENIGN PROLACTINOMA: Primary | ICD-10-CM

## 2021-01-04 DIAGNOSIS — E88.819 INSULIN RESISTANCE: ICD-10-CM

## 2021-01-04 DIAGNOSIS — R20.0 NUMBNESS: ICD-10-CM

## 2021-01-04 DIAGNOSIS — D35.2 BENIGN PROLACTINOMA: ICD-10-CM

## 2021-01-04 DIAGNOSIS — T73.0XXA HUNGRY, INITIAL ENCOUNTER: ICD-10-CM

## 2021-01-04 DIAGNOSIS — E22.1 HYPERPROLACTINEMIA: ICD-10-CM

## 2021-01-04 DIAGNOSIS — D64.9 ANEMIA, UNSPECIFIED TYPE: ICD-10-CM

## 2021-01-04 LAB
ALBUMIN SERPL BCP-MCNC: 3.8 G/DL (ref 3.5–5.2)
ALP SERPL-CCNC: 49 U/L (ref 55–135)
ALT SERPL W/O P-5'-P-CCNC: 10 U/L (ref 10–44)
ANION GAP SERPL CALC-SCNC: 7 MMOL/L (ref 8–16)
AST SERPL-CCNC: 20 U/L (ref 10–40)
BASOPHILS # BLD AUTO: 0.03 K/UL (ref 0–0.2)
BASOPHILS NFR BLD: 0.4 % (ref 0–1.9)
BILIRUB SERPL-MCNC: 0.5 MG/DL (ref 0.1–1)
BUN SERPL-MCNC: 13 MG/DL (ref 6–20)
CALCIUM SERPL-MCNC: 9 MG/DL (ref 8.7–10.5)
CHLORIDE SERPL-SCNC: 106 MMOL/L (ref 95–110)
CO2 SERPL-SCNC: 25 MMOL/L (ref 23–29)
CREAT SERPL-MCNC: 1 MG/DL (ref 0.5–1.4)
DIFFERENTIAL METHOD: ABNORMAL
EOSINOPHIL # BLD AUTO: 0.1 K/UL (ref 0–0.5)
EOSINOPHIL NFR BLD: 1 % (ref 0–8)
ERYTHROCYTE [DISTWIDTH] IN BLOOD BY AUTOMATED COUNT: 14.4 % (ref 11.5–14.5)
EST. GFR  (AFRICAN AMERICAN): >60 ML/MIN/1.73 M^2
EST. GFR  (NON AFRICAN AMERICAN): >60 ML/MIN/1.73 M^2
ESTIMATED AVG GLUCOSE: 111 MG/DL (ref 68–131)
ESTRADIOL SERPL-MCNC: 24 PG/ML
FSH SERPL-ACNC: 3.6 MIU/ML
GLUCOSE SERPL-MCNC: 87 MG/DL (ref 70–110)
HBA1C MFR BLD HPLC: 5.5 % (ref 4–5.6)
HCT VFR BLD AUTO: 42.7 % (ref 37–48.5)
HGB BLD-MCNC: 12.8 G/DL (ref 12–16)
IMM GRANULOCYTES # BLD AUTO: 0.01 K/UL (ref 0–0.04)
IMM GRANULOCYTES NFR BLD AUTO: 0.1 % (ref 0–0.5)
IRON SERPL-MCNC: 64 UG/DL (ref 30–160)
LH SERPL-ACNC: 2.1 MIU/ML
LYMPHOCYTES # BLD AUTO: 2.3 K/UL (ref 1–4.8)
LYMPHOCYTES NFR BLD: 31.1 % (ref 18–48)
MCH RBC QN AUTO: 26.3 PG (ref 27–31)
MCHC RBC AUTO-ENTMCNC: 30 G/DL (ref 32–36)
MCV RBC AUTO: 88 FL (ref 82–98)
MONOCYTES # BLD AUTO: 0.5 K/UL (ref 0.3–1)
MONOCYTES NFR BLD: 6.1 % (ref 4–15)
NEUTROPHILS # BLD AUTO: 4.5 K/UL (ref 1.8–7.7)
NEUTROPHILS NFR BLD: 61.3 % (ref 38–73)
NRBC BLD-RTO: 0 /100 WBC
PLATELET # BLD AUTO: 236 K/UL (ref 150–350)
PMV BLD AUTO: 12.3 FL (ref 9.2–12.9)
POTASSIUM SERPL-SCNC: 4 MMOL/L (ref 3.5–5.1)
PROLACTIN SERPL IA-MCNC: 3.8 NG/ML (ref 5.2–26.5)
PROT SERPL-MCNC: 7.3 G/DL (ref 6–8.4)
RBC # BLD AUTO: 4.86 M/UL (ref 4–5.4)
SATURATED IRON: 19 % (ref 20–50)
SODIUM SERPL-SCNC: 138 MMOL/L (ref 136–145)
TOTAL IRON BINDING CAPACITY: 339 UG/DL (ref 250–450)
TRANSFERRIN SERPL-MCNC: 229 MG/DL (ref 200–375)
VIT B12 SERPL-MCNC: 352 PG/ML (ref 210–950)
WBC # BLD AUTO: 7.36 K/UL (ref 3.9–12.7)

## 2021-01-04 PROCEDURE — 84146 ASSAY OF PROLACTIN: CPT

## 2021-01-04 PROCEDURE — 86703 HIV-1/HIV-2 1 RESULT ANTBDY: CPT

## 2021-01-04 PROCEDURE — 99214 PR OFFICE/OUTPT VISIT, EST, LEVL IV, 30-39 MIN: ICD-10-PCS | Mod: S$PBB,,, | Performed by: FAMILY MEDICINE

## 2021-01-04 PROCEDURE — 83001 ASSAY OF GONADOTROPIN (FSH): CPT

## 2021-01-04 PROCEDURE — 82607 VITAMIN B-12: CPT

## 2021-01-04 PROCEDURE — 83036 HEMOGLOBIN GLYCOSYLATED A1C: CPT

## 2021-01-04 PROCEDURE — 80053 COMPREHEN METABOLIC PANEL: CPT

## 2021-01-04 PROCEDURE — 83002 ASSAY OF GONADOTROPIN (LH): CPT

## 2021-01-04 PROCEDURE — 82670 ASSAY OF TOTAL ESTRADIOL: CPT

## 2021-01-04 PROCEDURE — 99214 OFFICE O/P EST MOD 30 MIN: CPT | Mod: PBBFAC | Performed by: FAMILY MEDICINE

## 2021-01-04 PROCEDURE — 83520 IMMUNOASSAY QUANT NOS NONAB: CPT | Mod: 59

## 2021-01-04 PROCEDURE — 99214 OFFICE O/P EST MOD 30 MIN: CPT | Mod: S$PBB,,, | Performed by: FAMILY MEDICINE

## 2021-01-04 PROCEDURE — 83540 ASSAY OF IRON: CPT

## 2021-01-04 PROCEDURE — 85025 COMPLETE CBC W/AUTO DIFF WBC: CPT

## 2021-01-04 PROCEDURE — 86803 HEPATITIS C AB TEST: CPT

## 2021-01-04 PROCEDURE — 36415 COLL VENOUS BLD VENIPUNCTURE: CPT

## 2021-01-04 PROCEDURE — 99999 PR PBB SHADOW E&M-EST. PATIENT-LVL IV: ICD-10-PCS | Mod: PBBFAC,,, | Performed by: FAMILY MEDICINE

## 2021-01-04 PROCEDURE — 99999 PR PBB SHADOW E&M-EST. PATIENT-LVL IV: CPT | Mod: PBBFAC,,, | Performed by: FAMILY MEDICINE

## 2021-01-04 PROCEDURE — 83520 IMMUNOASSAY QUANT NOS NONAB: CPT

## 2021-01-05 LAB
HCV AB SERPL QL IA: NEGATIVE
HIV 1+2 AB+HIV1 P24 AG SERPL QL IA: NEGATIVE

## 2021-01-06 ENCOUNTER — TELEPHONE (OUTPATIENT)
Dept: OBSTETRICS AND GYNECOLOGY | Facility: CLINIC | Age: 36
End: 2021-01-06

## 2021-01-06 ENCOUNTER — PATIENT MESSAGE (OUTPATIENT)
Dept: OBSTETRICS AND GYNECOLOGY | Facility: CLINIC | Age: 36
End: 2021-01-06

## 2021-01-07 ENCOUNTER — PATIENT MESSAGE (OUTPATIENT)
Dept: INTERNAL MEDICINE | Facility: CLINIC | Age: 36
End: 2021-01-07

## 2021-01-07 DIAGNOSIS — Z00.00 ROUTINE ADULT HEALTH MAINTENANCE: ICD-10-CM

## 2021-01-07 DIAGNOSIS — L60.0 INGROWN TOENAIL: Primary | ICD-10-CM

## 2021-01-07 RX ORDER — CABERGOLINE 0.5 MG/1
TABLET ORAL
Qty: 24 TABLET | Refills: 1 | Status: SHIPPED | OUTPATIENT
Start: 2021-01-07

## 2021-01-08 ENCOUNTER — LAB VISIT (OUTPATIENT)
Dept: LAB | Facility: HOSPITAL | Age: 36
End: 2021-01-08
Attending: FAMILY MEDICINE
Payer: MEDICAID

## 2021-01-08 DIAGNOSIS — Z00.00 ROUTINE ADULT HEALTH MAINTENANCE: ICD-10-CM

## 2021-01-08 LAB
CHOLEST SERPL-MCNC: 162 MG/DL (ref 120–199)
CHOLEST/HDLC SERPL: 2.7 {RATIO} (ref 2–5)
HDLC SERPL-MCNC: 61 MG/DL (ref 40–75)
HDLC SERPL: 37.7 % (ref 20–50)
LDLC SERPL CALC-MCNC: 87 MG/DL (ref 63–159)
NONHDLC SERPL-MCNC: 101 MG/DL
TRIGL SERPL-MCNC: 70 MG/DL (ref 30–150)

## 2021-01-08 PROCEDURE — 36415 COLL VENOUS BLD VENIPUNCTURE: CPT

## 2021-01-08 PROCEDURE — 80061 LIPID PANEL: CPT

## 2021-01-11 ENCOUNTER — OFFICE VISIT (OUTPATIENT)
Dept: PODIATRY | Facility: CLINIC | Age: 36
End: 2021-01-11
Payer: MEDICAID

## 2021-01-11 VITALS
HEART RATE: 54 BPM | SYSTOLIC BLOOD PRESSURE: 152 MMHG | WEIGHT: 279.56 LBS | DIASTOLIC BLOOD PRESSURE: 93 MMHG | HEIGHT: 67 IN | BODY MASS INDEX: 43.88 KG/M2

## 2021-01-11 DIAGNOSIS — L60.0 ONYCHOCRYPTOSIS: Primary | ICD-10-CM

## 2021-01-11 PROCEDURE — 99999 PR PBB SHADOW E&M-EST. PATIENT-LVL III: ICD-10-PCS | Mod: PBBFAC,,, | Performed by: PODIATRIST

## 2021-01-11 PROCEDURE — 99999 PR PBB SHADOW E&M-EST. PATIENT-LVL III: CPT | Mod: PBBFAC,,, | Performed by: PODIATRIST

## 2021-01-11 PROCEDURE — 99203 OFFICE O/P NEW LOW 30 MIN: CPT | Mod: S$PBB,,, | Performed by: PODIATRIST

## 2021-01-11 PROCEDURE — 99213 OFFICE O/P EST LOW 20 MIN: CPT | Mod: PBBFAC | Performed by: PODIATRIST

## 2021-01-11 PROCEDURE — 99203 PR OFFICE/OUTPT VISIT, NEW, LEVL III, 30-44 MIN: ICD-10-PCS | Mod: S$PBB,,, | Performed by: PODIATRIST

## 2021-01-15 LAB — GHRELIN, TOTAL (PLASMA): 143 PG/ML

## 2021-01-18 ENCOUNTER — PATIENT MESSAGE (OUTPATIENT)
Dept: INTERNAL MEDICINE | Facility: CLINIC | Age: 36
End: 2021-01-18

## 2021-01-18 ENCOUNTER — PATIENT MESSAGE (OUTPATIENT)
Dept: OBSTETRICS AND GYNECOLOGY | Facility: CLINIC | Age: 36
End: 2021-01-18

## 2021-01-18 DIAGNOSIS — E88.819 INSULIN RESISTANCE: ICD-10-CM

## 2021-01-18 DIAGNOSIS — D35.2 BENIGN PROLACTINOMA: Primary | ICD-10-CM

## 2021-01-18 LAB — LEPTIN SERPL-MCNC: 27 NG/ML

## 2021-01-25 ENCOUNTER — PATIENT MESSAGE (OUTPATIENT)
Dept: OBSTETRICS AND GYNECOLOGY | Facility: CLINIC | Age: 36
End: 2021-01-25

## 2021-03-05 ENCOUNTER — TELEPHONE (OUTPATIENT)
Dept: RADIOLOGY | Facility: HOSPITAL | Age: 36
End: 2021-03-05

## 2021-03-08 ENCOUNTER — TELEPHONE (OUTPATIENT)
Dept: OBSTETRICS AND GYNECOLOGY | Facility: CLINIC | Age: 36
End: 2021-03-08

## 2021-03-23 ENCOUNTER — OFFICE VISIT (OUTPATIENT)
Dept: URGENT CARE | Facility: CLINIC | Age: 36
End: 2021-03-23
Payer: MEDICAID

## 2021-03-23 VITALS
SYSTOLIC BLOOD PRESSURE: 144 MMHG | OXYGEN SATURATION: 96 % | HEART RATE: 60 BPM | DIASTOLIC BLOOD PRESSURE: 79 MMHG | TEMPERATURE: 98 F | RESPIRATION RATE: 20 BRPM

## 2021-03-23 DIAGNOSIS — K13.79 MOUTH PAIN: Primary | ICD-10-CM

## 2021-03-23 PROCEDURE — 99213 PR OFFICE/OUTPT VISIT, EST, LEVL III, 20-29 MIN: ICD-10-PCS | Mod: S$GLB,,, | Performed by: NURSE PRACTITIONER

## 2021-03-23 PROCEDURE — 99213 OFFICE O/P EST LOW 20 MIN: CPT | Mod: S$GLB,,, | Performed by: NURSE PRACTITIONER

## 2021-03-24 ENCOUNTER — PATIENT MESSAGE (OUTPATIENT)
Dept: INTERNAL MEDICINE | Facility: CLINIC | Age: 36
End: 2021-03-24

## 2021-03-26 ENCOUNTER — TELEPHONE (OUTPATIENT)
Dept: URGENT CARE | Facility: CLINIC | Age: 36
End: 2021-03-26

## 2021-04-01 ENCOUNTER — PATIENT MESSAGE (OUTPATIENT)
Dept: INTERNAL MEDICINE | Facility: CLINIC | Age: 36
End: 2021-04-01

## 2021-04-08 ENCOUNTER — TELEPHONE (OUTPATIENT)
Dept: RADIOLOGY | Facility: HOSPITAL | Age: 36
End: 2021-04-08

## 2021-04-09 ENCOUNTER — HOSPITAL ENCOUNTER (OUTPATIENT)
Dept: RADIOLOGY | Facility: HOSPITAL | Age: 36
Discharge: HOME | End: 2021-04-09
Attending: OBSTETRICS & GYNECOLOGY
Payer: MEDICAID

## 2021-04-09 ENCOUNTER — PATIENT MESSAGE (OUTPATIENT)
Dept: OBSTETRICS AND GYNECOLOGY | Facility: CLINIC | Age: 36
End: 2021-04-09

## 2021-04-09 DIAGNOSIS — N91.2 AMENORRHEA: ICD-10-CM

## 2021-04-09 PROCEDURE — 76830 TRANSVAGINAL US NON-OB: CPT | Mod: 26,,, | Performed by: RADIOLOGY

## 2021-04-09 PROCEDURE — 76830 US PELVIS COMP WITH TRANSVAG NON-OB (XPD): ICD-10-PCS | Mod: 26,,, | Performed by: RADIOLOGY

## 2021-04-09 PROCEDURE — 76856 US EXAM PELVIC COMPLETE: CPT | Mod: TC

## 2021-04-09 PROCEDURE — 76856 US EXAM PELVIC COMPLETE: CPT | Mod: 26,,, | Performed by: RADIOLOGY

## 2021-04-09 PROCEDURE — 76856 US PELVIS COMP WITH TRANSVAG NON-OB (XPD): ICD-10-PCS | Mod: 26,,, | Performed by: RADIOLOGY

## 2021-04-12 ENCOUNTER — OFFICE VISIT (OUTPATIENT)
Dept: OBSTETRICS AND GYNECOLOGY | Facility: CLINIC | Age: 36
End: 2021-04-12
Payer: MEDICAID

## 2021-04-12 ENCOUNTER — OFFICE VISIT (OUTPATIENT)
Dept: INTERNAL MEDICINE | Facility: CLINIC | Age: 36
End: 2021-04-12
Payer: MEDICAID

## 2021-04-12 VITALS
HEIGHT: 67 IN | WEIGHT: 270.94 LBS | DIASTOLIC BLOOD PRESSURE: 78 MMHG | SYSTOLIC BLOOD PRESSURE: 112 MMHG | BODY MASS INDEX: 42.53 KG/M2

## 2021-04-12 VITALS
TEMPERATURE: 98 F | BODY MASS INDEX: 42.25 KG/M2 | HEART RATE: 78 BPM | DIASTOLIC BLOOD PRESSURE: 84 MMHG | OXYGEN SATURATION: 98 % | HEIGHT: 67 IN | WEIGHT: 269.19 LBS | SYSTOLIC BLOOD PRESSURE: 124 MMHG

## 2021-04-12 DIAGNOSIS — N83.202 LEFT OVARIAN CYST: ICD-10-CM

## 2021-04-12 DIAGNOSIS — Z01.419 WELL WOMAN EXAM WITH ROUTINE GYNECOLOGICAL EXAM: Primary | ICD-10-CM

## 2021-04-12 DIAGNOSIS — L72.9 CYST OF SKIN: Primary | ICD-10-CM

## 2021-04-12 PROCEDURE — 99213 OFFICE O/P EST LOW 20 MIN: CPT | Mod: PBBFAC | Performed by: OBSTETRICS & GYNECOLOGY

## 2021-04-12 PROCEDURE — 99999 PR PBB SHADOW E&M-EST. PATIENT-LVL III: CPT | Mod: PBBFAC,,, | Performed by: OBSTETRICS & GYNECOLOGY

## 2021-04-12 PROCEDURE — 99999 PR PBB SHADOW E&M-EST. PATIENT-LVL III: ICD-10-PCS | Mod: PBBFAC,,, | Performed by: FAMILY MEDICINE

## 2021-04-12 PROCEDURE — 99395 PR PREVENTIVE VISIT,EST,18-39: ICD-10-PCS | Mod: S$PBB,,, | Performed by: OBSTETRICS & GYNECOLOGY

## 2021-04-12 PROCEDURE — 99999 PR PBB SHADOW E&M-EST. PATIENT-LVL III: ICD-10-PCS | Mod: PBBFAC,,, | Performed by: OBSTETRICS & GYNECOLOGY

## 2021-04-12 PROCEDURE — 99999 PR PBB SHADOW E&M-EST. PATIENT-LVL III: CPT | Mod: PBBFAC,,, | Performed by: FAMILY MEDICINE

## 2021-04-12 PROCEDURE — 99213 OFFICE O/P EST LOW 20 MIN: CPT | Mod: PBBFAC,27 | Performed by: FAMILY MEDICINE

## 2021-04-12 PROCEDURE — 99213 OFFICE O/P EST LOW 20 MIN: CPT | Mod: S$PBB,,, | Performed by: FAMILY MEDICINE

## 2021-04-12 PROCEDURE — 87624 HPV HI-RISK TYP POOLED RSLT: CPT | Performed by: OBSTETRICS & GYNECOLOGY

## 2021-04-12 PROCEDURE — 99395 PREV VISIT EST AGE 18-39: CPT | Mod: S$PBB,,, | Performed by: OBSTETRICS & GYNECOLOGY

## 2021-04-12 PROCEDURE — 88175 CYTOPATH C/V AUTO FLUID REDO: CPT | Performed by: OBSTETRICS & GYNECOLOGY

## 2021-04-12 PROCEDURE — 99213 PR OFFICE/OUTPT VISIT, EST, LEVL III, 20-29 MIN: ICD-10-PCS | Mod: S$PBB,,, | Performed by: FAMILY MEDICINE

## 2021-04-12 RX ORDER — DESOGESTREL AND ETHINYL ESTRADIOL 0.15-0.03
1 KIT ORAL DAILY
Qty: 28 TABLET | Refills: 11 | Status: SHIPPED | OUTPATIENT
Start: 2021-04-12 | End: 2022-04-12

## 2021-04-15 LAB
HPV HR 12 DNA SPEC QL NAA+PROBE: NEGATIVE
HPV16 AG SPEC QL: NEGATIVE
HPV18 DNA SPEC QL NAA+PROBE: NEGATIVE

## 2021-04-16 LAB
FINAL PATHOLOGIC DIAGNOSIS: NORMAL
Lab: NORMAL

## 2021-04-29 ENCOUNTER — PATIENT MESSAGE (OUTPATIENT)
Dept: INTERNAL MEDICINE | Facility: CLINIC | Age: 36
End: 2021-04-29

## 2021-05-04 ENCOUNTER — PATIENT MESSAGE (OUTPATIENT)
Dept: OBSTETRICS AND GYNECOLOGY | Facility: CLINIC | Age: 36
End: 2021-05-04

## 2021-05-04 ENCOUNTER — PATIENT MESSAGE (OUTPATIENT)
Dept: INTERNAL MEDICINE | Facility: CLINIC | Age: 36
End: 2021-05-04

## 2021-05-04 DIAGNOSIS — E88.819 INSULIN RESISTANCE: Primary | ICD-10-CM

## 2021-05-04 DIAGNOSIS — R73.03 PREDIABETES: ICD-10-CM

## 2021-05-04 DIAGNOSIS — N39.3 STRESS INCONTINENCE: ICD-10-CM

## 2021-05-07 ENCOUNTER — PATIENT MESSAGE (OUTPATIENT)
Dept: INTERNAL MEDICINE | Facility: CLINIC | Age: 36
End: 2021-05-07

## 2021-05-08 ENCOUNTER — PATIENT MESSAGE (OUTPATIENT)
Dept: INTERNAL MEDICINE | Facility: CLINIC | Age: 36
End: 2021-05-08

## 2021-07-09 ENCOUNTER — TELEPHONE (OUTPATIENT)
Dept: RADIOLOGY | Facility: HOSPITAL | Age: 36
End: 2021-07-09

## 2021-07-20 ENCOUNTER — TELEPHONE (OUTPATIENT)
Dept: RADIOLOGY | Facility: HOSPITAL | Age: 36
End: 2021-07-20

## 2021-07-21 ENCOUNTER — TELEPHONE (OUTPATIENT)
Dept: RADIOLOGY | Facility: HOSPITAL | Age: 36
End: 2021-07-21

## 2021-07-22 ENCOUNTER — PATIENT MESSAGE (OUTPATIENT)
Dept: OBSTETRICS AND GYNECOLOGY | Facility: CLINIC | Age: 36
End: 2021-07-22

## 2021-09-07 ENCOUNTER — PATIENT MESSAGE (OUTPATIENT)
Dept: INTERNAL MEDICINE | Facility: CLINIC | Age: 36
End: 2021-09-07

## 2021-09-09 ENCOUNTER — PATIENT MESSAGE (OUTPATIENT)
Dept: OBSTETRICS AND GYNECOLOGY | Facility: CLINIC | Age: 36
End: 2021-09-09

## 2021-09-09 ENCOUNTER — TELEPHONE (OUTPATIENT)
Dept: RADIOLOGY | Facility: HOSPITAL | Age: 36
End: 2021-09-09

## 2021-10-06 ENCOUNTER — PATIENT MESSAGE (OUTPATIENT)
Dept: OBSTETRICS AND GYNECOLOGY | Facility: CLINIC | Age: 36
End: 2021-10-06

## 2021-10-06 ENCOUNTER — PATIENT OUTREACH (OUTPATIENT)
Dept: ADMINISTRATIVE | Facility: OTHER | Age: 36
End: 2021-10-06

## 2021-10-17 ENCOUNTER — PATIENT MESSAGE (OUTPATIENT)
Dept: OBSTETRICS AND GYNECOLOGY | Facility: CLINIC | Age: 36
End: 2021-10-17
Payer: MEDICAID

## 2021-11-19 ENCOUNTER — PATIENT MESSAGE (OUTPATIENT)
Dept: INTERNAL MEDICINE | Facility: CLINIC | Age: 36
End: 2021-11-19
Payer: MEDICAID

## 2021-11-22 ENCOUNTER — OFFICE VISIT (OUTPATIENT)
Dept: INTERNAL MEDICINE | Facility: CLINIC | Age: 36
End: 2021-11-22
Payer: MEDICAID

## 2021-11-22 DIAGNOSIS — E88.819 INSULIN RESISTANCE: Primary | ICD-10-CM

## 2021-11-22 DIAGNOSIS — E66.01 MORBID OBESITY WITH BMI OF 45.0-49.9, ADULT: ICD-10-CM

## 2021-11-22 PROCEDURE — 99213 PR OFFICE/OUTPT VISIT, EST, LEVL III, 20-29 MIN: ICD-10-PCS | Mod: 95,,, | Performed by: FAMILY MEDICINE

## 2021-11-22 PROCEDURE — 99213 OFFICE O/P EST LOW 20 MIN: CPT | Mod: 95,,, | Performed by: FAMILY MEDICINE

## 2021-11-22 RX ORDER — PHENTERMINE HYDROCHLORIDE 37.5 MG/1
37.5 TABLET ORAL
Qty: 30 TABLET | Refills: 0 | Status: SHIPPED | OUTPATIENT
Start: 2021-11-22 | End: 2021-12-22

## 2022-02-15 ENCOUNTER — TELEPHONE (OUTPATIENT)
Dept: INTERNAL MEDICINE | Facility: CLINIC | Age: 37
End: 2022-02-15
Payer: MEDICAID

## 2022-02-15 NOTE — TELEPHONE ENCOUNTER
----- Message from Jaclyn Palomares sent at 2/15/2022  7:47 AM CST -----  Regarding: sooner appt  Contact: pt  Pt requesting a sooner appt. [phone]

## 2022-02-15 NOTE — TELEPHONE ENCOUNTER
----- Message from Karley Hough sent at 2/15/2022  8:07 AM CST -----  Contact: patient  Patient called to consult with nurse or staff regarding her appointment on 2/16. She would like to change the time to later in the day and would like a call back. Patient can be reached at [phone]. Thanks/MR

## 2022-02-16 ENCOUNTER — PATIENT MESSAGE (OUTPATIENT)
Dept: INTERNAL MEDICINE | Facility: CLINIC | Age: 37
End: 2022-02-16
Payer: MEDICAID

## 2022-02-16 ENCOUNTER — TELEPHONE (OUTPATIENT)
Dept: INTERNAL MEDICINE | Facility: CLINIC | Age: 37
End: 2022-02-16
Payer: MEDICAID

## 2022-02-16 NOTE — TELEPHONE ENCOUNTER
----- Message from Phuong Elias sent at 2/16/2022  7:49 AM CST -----  Pt is requesting a call back in regards to getting a later appt for today. Pt can be reached at [phone] (pxqv)

## 2022-02-22 ENCOUNTER — PATIENT MESSAGE (OUTPATIENT)
Dept: INTERNAL MEDICINE | Facility: CLINIC | Age: 37
End: 2022-02-22
Payer: MEDICAID

## 2022-02-22 ENCOUNTER — PATIENT MESSAGE (OUTPATIENT)
Dept: ADMINISTRATIVE | Facility: OTHER | Age: 37
End: 2022-02-22
Payer: MEDICAID

## 2022-02-22 ENCOUNTER — TELEPHONE (OUTPATIENT)
Dept: INTERNAL MEDICINE | Facility: CLINIC | Age: 37
End: 2022-02-22
Payer: MEDICAID

## 2022-02-22 NOTE — TELEPHONE ENCOUNTER
----- Message from Deborah Cortez sent at 2/22/2022  8:12 AM CST -----  Regarding: Call Back  Pt had an 8:20 with Dr ARCEO. Pt did show up. I did inform her the nurse did try to reach her but was unable. Pt asked why a message wasn't sent to her portal to inform her since we couldn't get in touch with her. She would like the nurse to call her back to see about getting back in with Dr ARCEO. It wouldn't let me reschedule because of her insurance. If the nurse could call her back at [phone] to reschedule.

## 2022-02-22 NOTE — TELEPHONE ENCOUNTER
PT requesting a referral to GYN for wellness she stated she was seeing Dr Thompson but she is no longer with [facility]       Pt is also coming in for annual appt does she need labs before her appt ? She is not established with you her pcp Dr Moore.

## 2022-02-23 ENCOUNTER — PATIENT MESSAGE (OUTPATIENT)
Dept: INTERNAL MEDICINE | Facility: CLINIC | Age: 37
End: 2022-02-23
Payer: MEDICAID

## 2022-02-28 ENCOUNTER — TELEPHONE (OUTPATIENT)
Dept: INTERNAL MEDICINE | Facility: CLINIC | Age: 37
End: 2022-02-28
Payer: MEDICAID

## 2022-02-28 ENCOUNTER — PATIENT MESSAGE (OUTPATIENT)
Dept: INTERNAL MEDICINE | Facility: CLINIC | Age: 37
End: 2022-02-28
Payer: MEDICAID

## 2022-02-28 NOTE — TELEPHONE ENCOUNTER
----- Message from Meena Escobedo sent at 2/28/2022 11:32 AM CST -----  Contact: Ivana Heath called regarding scheduling an appointment for an annual, please send her a message via the [facility]      Thanks  Kb

## 2022-03-07 ENCOUNTER — LAB VISIT (OUTPATIENT)
Dept: LAB | Facility: HOSPITAL | Age: 37
End: 2022-03-07
Attending: FAMILY MEDICINE
Payer: MEDICAID

## 2022-03-07 ENCOUNTER — PATIENT MESSAGE (OUTPATIENT)
Dept: INTERNAL MEDICINE | Facility: CLINIC | Age: 37
End: 2022-03-07

## 2022-03-07 ENCOUNTER — OFFICE VISIT (OUTPATIENT)
Dept: INTERNAL MEDICINE | Facility: CLINIC | Age: 37
End: 2022-03-07
Payer: MEDICAID

## 2022-03-07 VITALS
HEIGHT: 67 IN | HEART RATE: 61 BPM | BODY MASS INDEX: 39.93 KG/M2 | OXYGEN SATURATION: 97 % | TEMPERATURE: 98 F | SYSTOLIC BLOOD PRESSURE: 118 MMHG | DIASTOLIC BLOOD PRESSURE: 74 MMHG | WEIGHT: 254.44 LBS

## 2022-03-07 DIAGNOSIS — Z86.018 HISTORY OF PROLACTINOMA: ICD-10-CM

## 2022-03-07 DIAGNOSIS — R51.9 NONINTRACTABLE HEADACHE, UNSPECIFIED CHRONICITY PATTERN, UNSPECIFIED HEADACHE TYPE: ICD-10-CM

## 2022-03-07 DIAGNOSIS — E88.819 INSULIN RESISTANCE: ICD-10-CM

## 2022-03-07 DIAGNOSIS — Z12.4 SCREENING FOR MALIGNANT NEOPLASM OF CERVIX: ICD-10-CM

## 2022-03-07 DIAGNOSIS — Z00.00 ROUTINE GENERAL MEDICAL EXAMINATION AT A HEALTH CARE FACILITY: ICD-10-CM

## 2022-03-07 DIAGNOSIS — Z00.00 ROUTINE GENERAL MEDICAL EXAMINATION AT A HEALTH CARE FACILITY: Primary | ICD-10-CM

## 2022-03-07 DIAGNOSIS — Z91.09 ENVIRONMENTAL ALLERGIES: ICD-10-CM

## 2022-03-07 LAB
ALBUMIN SERPL BCP-MCNC: 3.8 G/DL (ref 3.5–5.2)
ALP SERPL-CCNC: 44 U/L (ref 55–135)
ALT SERPL W/O P-5'-P-CCNC: 31 U/L (ref 10–44)
ANION GAP SERPL CALC-SCNC: 13 MMOL/L (ref 8–16)
AST SERPL-CCNC: 53 U/L (ref 10–40)
BASOPHILS # BLD AUTO: 0.03 K/UL (ref 0–0.2)
BASOPHILS NFR BLD: 0.4 % (ref 0–1.9)
BILIRUB SERPL-MCNC: 0.4 MG/DL (ref 0.1–1)
BUN SERPL-MCNC: 11 MG/DL (ref 6–20)
CALCIUM SERPL-MCNC: 10 MG/DL (ref 8.7–10.5)
CHLORIDE SERPL-SCNC: 107 MMOL/L (ref 95–110)
CHOLEST SERPL-MCNC: 161 MG/DL (ref 120–199)
CHOLEST/HDLC SERPL: 2.5 {RATIO} (ref 2–5)
CO2 SERPL-SCNC: 22 MMOL/L (ref 23–29)
CREAT SERPL-MCNC: 1 MG/DL (ref 0.5–1.4)
DIFFERENTIAL METHOD: ABNORMAL
EOSINOPHIL # BLD AUTO: 0 K/UL (ref 0–0.5)
EOSINOPHIL NFR BLD: 0.5 % (ref 0–8)
ERYTHROCYTE [DISTWIDTH] IN BLOOD BY AUTOMATED COUNT: 15.3 % (ref 11.5–14.5)
EST. GFR  (AFRICAN AMERICAN): >60 ML/MIN/1.73 M^2
EST. GFR  (NON AFRICAN AMERICAN): >60 ML/MIN/1.73 M^2
ESTIMATED AVG GLUCOSE: 105 MG/DL (ref 68–131)
FERRITIN SERPL-MCNC: 89 NG/ML (ref 20–300)
GLUCOSE SERPL-MCNC: 58 MG/DL (ref 70–110)
HBA1C MFR BLD: 5.3 % (ref 4–5.6)
HCT VFR BLD AUTO: 40.6 % (ref 37–48.5)
HDLC SERPL-MCNC: 65 MG/DL (ref 40–75)
HDLC SERPL: 40.4 % (ref 20–50)
HGB BLD-MCNC: 12.2 G/DL (ref 12–16)
IMM GRANULOCYTES # BLD AUTO: 0.02 K/UL (ref 0–0.04)
IMM GRANULOCYTES NFR BLD AUTO: 0.2 % (ref 0–0.5)
IRON SERPL-MCNC: 74 UG/DL (ref 30–160)
LDLC SERPL CALC-MCNC: 83 MG/DL (ref 63–159)
LYMPHOCYTES # BLD AUTO: 3.3 K/UL (ref 1–4.8)
LYMPHOCYTES NFR BLD: 39.2 % (ref 18–48)
MCH RBC QN AUTO: 27.1 PG (ref 27–31)
MCHC RBC AUTO-ENTMCNC: 30 G/DL (ref 32–36)
MCV RBC AUTO: 90 FL (ref 82–98)
MONOCYTES # BLD AUTO: 0.6 K/UL (ref 0.3–1)
MONOCYTES NFR BLD: 7.1 % (ref 4–15)
NEUTROPHILS # BLD AUTO: 4.4 K/UL (ref 1.8–7.7)
NEUTROPHILS NFR BLD: 52.6 % (ref 38–73)
NONHDLC SERPL-MCNC: 96 MG/DL
NRBC BLD-RTO: 0 /100 WBC
PLATELET # BLD AUTO: 281 K/UL (ref 150–450)
PMV BLD AUTO: 12.4 FL (ref 9.2–12.9)
POTASSIUM SERPL-SCNC: 4.4 MMOL/L (ref 3.5–5.1)
PROLACTIN SERPL IA-MCNC: 18.7 NG/ML (ref 5.2–26.5)
PROT SERPL-MCNC: 7.3 G/DL (ref 6–8.4)
RBC # BLD AUTO: 4.51 M/UL (ref 4–5.4)
SATURATED IRON: 23 % (ref 20–50)
SODIUM SERPL-SCNC: 142 MMOL/L (ref 136–145)
TOTAL IRON BINDING CAPACITY: 318 UG/DL (ref 250–450)
TRANSFERRIN SERPL-MCNC: 215 MG/DL (ref 200–375)
TRIGL SERPL-MCNC: 65 MG/DL (ref 30–150)
TSH SERPL DL<=0.005 MIU/L-ACNC: 1.73 UIU/ML (ref 0.4–4)
WBC # BLD AUTO: 8.31 K/UL (ref 3.9–12.7)

## 2022-03-07 PROCEDURE — 1159F MED LIST DOCD IN RCRD: CPT | Mod: CPTII,,, | Performed by: FAMILY MEDICINE

## 2022-03-07 PROCEDURE — 83036 HEMOGLOBIN GLYCOSYLATED A1C: CPT | Performed by: FAMILY MEDICINE

## 2022-03-07 PROCEDURE — 82728 ASSAY OF FERRITIN: CPT | Performed by: FAMILY MEDICINE

## 2022-03-07 PROCEDURE — 84146 ASSAY OF PROLACTIN: CPT | Performed by: FAMILY MEDICINE

## 2022-03-07 PROCEDURE — 36415 COLL VENOUS BLD VENIPUNCTURE: CPT | Mod: PO | Performed by: FAMILY MEDICINE

## 2022-03-07 PROCEDURE — 3074F SYST BP LT 130 MM HG: CPT | Mod: CPTII,,, | Performed by: FAMILY MEDICINE

## 2022-03-07 PROCEDURE — 84466 ASSAY OF TRANSFERRIN: CPT | Performed by: FAMILY MEDICINE

## 2022-03-07 PROCEDURE — 80061 LIPID PANEL: CPT | Performed by: FAMILY MEDICINE

## 2022-03-07 PROCEDURE — 3078F DIAST BP <80 MM HG: CPT | Mod: CPTII,,, | Performed by: FAMILY MEDICINE

## 2022-03-07 PROCEDURE — 85025 COMPLETE CBC W/AUTO DIFF WBC: CPT | Performed by: FAMILY MEDICINE

## 2022-03-07 PROCEDURE — 3078F PR MOST RECENT DIASTOLIC BLOOD PRESSURE < 80 MM HG: ICD-10-PCS | Mod: CPTII,,, | Performed by: FAMILY MEDICINE

## 2022-03-07 PROCEDURE — 99395 PREV VISIT EST AGE 18-39: CPT | Mod: S$PBB,,, | Performed by: FAMILY MEDICINE

## 2022-03-07 PROCEDURE — 80053 COMPREHEN METABOLIC PANEL: CPT | Performed by: FAMILY MEDICINE

## 2022-03-07 PROCEDURE — 99999 PR PBB SHADOW E&M-EST. PATIENT-LVL V: CPT | Mod: PBBFAC,,, | Performed by: FAMILY MEDICINE

## 2022-03-07 PROCEDURE — 84443 ASSAY THYROID STIM HORMONE: CPT | Performed by: FAMILY MEDICINE

## 2022-03-07 PROCEDURE — 3074F PR MOST RECENT SYSTOLIC BLOOD PRESSURE < 130 MM HG: ICD-10-PCS | Mod: CPTII,,, | Performed by: FAMILY MEDICINE

## 2022-03-07 PROCEDURE — 1160F PR REVIEW ALL MEDS BY PRESCRIBER/CLIN PHARMACIST DOCUMENTED: ICD-10-PCS | Mod: CPTII,,, | Performed by: FAMILY MEDICINE

## 2022-03-07 PROCEDURE — 1160F RVW MEDS BY RX/DR IN RCRD: CPT | Mod: CPTII,,, | Performed by: FAMILY MEDICINE

## 2022-03-07 PROCEDURE — 3008F BODY MASS INDEX DOCD: CPT | Mod: CPTII,,, | Performed by: FAMILY MEDICINE

## 2022-03-07 PROCEDURE — 1159F PR MEDICATION LIST DOCUMENTED IN MEDICAL RECORD: ICD-10-PCS | Mod: CPTII,,, | Performed by: FAMILY MEDICINE

## 2022-03-07 PROCEDURE — 99999 PR PBB SHADOW E&M-EST. PATIENT-LVL V: ICD-10-PCS | Mod: PBBFAC,,, | Performed by: FAMILY MEDICINE

## 2022-03-07 PROCEDURE — 3008F PR BODY MASS INDEX (BMI) DOCUMENTED: ICD-10-PCS | Mod: CPTII,,, | Performed by: FAMILY MEDICINE

## 2022-03-07 PROCEDURE — 99215 OFFICE O/P EST HI 40 MIN: CPT | Mod: PBBFAC,PO | Performed by: FAMILY MEDICINE

## 2022-03-07 PROCEDURE — 99395 PR PREVENTIVE VISIT,EST,18-39: ICD-10-PCS | Mod: S$PBB,,, | Performed by: FAMILY MEDICINE

## 2022-03-07 RX ORDER — CYANOCOBALAMIN 500 UG/1
SPRAY NASAL
COMMUNITY
Start: 2022-02-22

## 2022-03-07 RX ORDER — CABERGOLINE 0.5 MG/1
0.25 TABLET ORAL
COMMUNITY
Start: 2022-01-10 | End: 2022-03-07

## 2022-03-07 RX ORDER — DESOGESTREL AND ETHINYL ESTRADIOL 0.15-0.03
KIT ORAL
COMMUNITY
Start: 2021-07-26 | End: 2022-03-07

## 2022-03-07 RX ORDER — MELATONIN 10 MG/ML
DROPS ORAL
COMMUNITY
Start: 2021-07-30

## 2022-03-07 RX ORDER — FERROUS SULFATE 325(65) MG
TABLET ORAL
COMMUNITY
Start: 2021-07-26 | End: 2022-06-02

## 2022-03-07 NOTE — PROGRESS NOTES
Subjective:       Patient ID: Ivana Blair is a 37 y.o. female.    Chief Complaint: Annual Exam    Pt is here to est care today.    Ivana Blair is a 37 y.o. female and is here for a comprehensive physical exam.    Do you take any herbs or supplements that were not prescribed by a doctor? no  Are you taking calcium supplements? yes  Are you taking aspirin daily? no     History:  Any STD's in the past? none    The following portions of the patient's history were reviewed and updated as appropriate: allergies, current medications, past family history, past medical history, past social history, past surgical history and problem list.    Review of Systems  Do you have pain that bothers you in your daily life? no  Pertinent items are noted in HPI.      2. Patient Counseling:  --Nutrition: Stressed importance of moderation in sodium/caffeine intake, saturated fat and cholesterol, caloric balance.  --Exercise: Stressed the importance of regular exercise.   --Substance Abuse: Discussed cessation/primary prevention of tobacco, alcohol - nonuser   --Sexuality: Discussed sexually transmitted disease.  --Injury prevention: Discussed safety belts, smoke detector.   --Dental health: Discussed dental health.   --Immunizations reviewed.    3. Discussed the patient's BMI.  4. Follow up as needed for acute illness      Review of Systems   Constitutional: Negative for fever.   HENT: Negative for congestion.    Eyes: Negative for discharge.   Respiratory: Negative for shortness of breath.    Cardiovascular: Negative for chest pain.   Gastrointestinal: Negative for abdominal pain.   Genitourinary: Negative for difficulty urinating.   Musculoskeletal: Negative for joint swelling.   Neurological: Positive for headaches. Negative for dizziness.   Psychiatric/Behavioral: Negative for agitation.       Objective:      Physical Exam  Vitals and nursing note reviewed.   Constitutional:       General: She is not in acute  distress.     Appearance: Normal appearance. She is well-developed. She is not diaphoretic.   HENT:      Head: Normocephalic and atraumatic.   Eyes:      General: No scleral icterus.     Conjunctiva/sclera: Conjunctivae normal.   Cardiovascular:      Rate and Rhythm: Normal rate and regular rhythm.   Pulmonary:      Effort: Pulmonary effort is normal. No respiratory distress.      Breath sounds: Normal breath sounds. No wheezing.   Abdominal:      General: There is no distension.      Palpations: Abdomen is soft.      Tenderness: There is no abdominal tenderness. There is no guarding.   Skin:     General: Skin is warm and dry.      Coloration: Skin is not pale.      Findings: No erythema or rash.      Comments: Good turgor   Neurological:      Mental Status: She is alert.   Psychiatric:         Mood and Affect: Mood normal.         Behavior: Behavior normal.         Thought Content: Thought content normal.         Judgment: Judgment normal.         Assessment:       1. Routine general medical examination at a health care facility    2. History of prolactinoma    3. Insulin resistance    4. Environmental allergies    5. Screening for malignant neoplasm of cervix    6. Nonintractable headache, unspecified chronicity pattern, unspecified headache type        Plan:     Problem List Items Addressed This Visit        Renal/    Screening for malignant neoplasm of cervix    Relevant Orders    Ambulatory referral/consult to Obstetrics / Gynecology       Oncology    History of prolactinoma    Overview     S/p resection           Relevant Orders    Ambulatory referral/consult to Endocrinology    Ambulatory referral/consult to Ophthalmology    Prolactin    Ambulatory referral/consult to Neurology       Endocrine    RESOLVED: Insulin resistance       Other    Environmental allergies    Relevant Orders    Ambulatory referral/consult to Allergy    Routine general medical examination at a health care facility - Primary     Relevant Orders    CBC Auto Differential    Comprehensive Metabolic Panel    Hemoglobin A1C    Ferritin    Iron and TIBC    Lipid Panel    TSH      Other Visit Diagnoses     Nonintractable headache, unspecified chronicity pattern, unspecified headache type        Relevant Orders    Ambulatory referral/consult to Neurology

## 2022-03-22 ENCOUNTER — PATIENT MESSAGE (OUTPATIENT)
Dept: INTERNAL MEDICINE | Facility: CLINIC | Age: 37
End: 2022-03-22
Payer: MEDICAID

## 2022-03-23 ENCOUNTER — OFFICE VISIT (OUTPATIENT)
Dept: INTERNAL MEDICINE | Facility: CLINIC | Age: 37
End: 2022-03-23
Payer: MEDICAID

## 2022-03-23 ENCOUNTER — OFFICE VISIT (OUTPATIENT)
Dept: OBSTETRICS AND GYNECOLOGY | Facility: CLINIC | Age: 37
End: 2022-03-23
Payer: MEDICAID

## 2022-03-23 VITALS
TEMPERATURE: 97 F | WEIGHT: 252.44 LBS | SYSTOLIC BLOOD PRESSURE: 120 MMHG | BODY MASS INDEX: 39.62 KG/M2 | DIASTOLIC BLOOD PRESSURE: 86 MMHG | HEIGHT: 67 IN | HEART RATE: 92 BPM

## 2022-03-23 VITALS
SYSTOLIC BLOOD PRESSURE: 118 MMHG | BODY MASS INDEX: 39.55 KG/M2 | WEIGHT: 252 LBS | HEIGHT: 67 IN | DIASTOLIC BLOOD PRESSURE: 72 MMHG

## 2022-03-23 DIAGNOSIS — L02.421 FURUNCLE OF RIGHT AXILLA: Primary | ICD-10-CM

## 2022-03-23 DIAGNOSIS — E55.9 VITAMIN D INSUFFICIENCY: ICD-10-CM

## 2022-03-23 DIAGNOSIS — Z12.4 SCREENING FOR MALIGNANT NEOPLASM OF CERVIX: ICD-10-CM

## 2022-03-23 DIAGNOSIS — N83.202 LEFT OVARIAN CYST: Primary | ICD-10-CM

## 2022-03-23 DIAGNOSIS — Z86.018 HISTORY OF PROLACTINOMA: ICD-10-CM

## 2022-03-23 DIAGNOSIS — E66.9 OBESITY, CLASS II, BMI 35-39.9: ICD-10-CM

## 2022-03-23 DIAGNOSIS — Z90.3 HISTORY OF SLEEVE GASTRECTOMY: ICD-10-CM

## 2022-03-23 PROBLEM — E66.01 MORBID OBESITY WITH BMI OF 45.0-49.9, ADULT: Status: RESOLVED | Noted: 2019-05-02 | Resolved: 2022-03-23

## 2022-03-23 PROBLEM — Z00.00 ROUTINE GENERAL MEDICAL EXAMINATION AT A HEALTH CARE FACILITY: Status: RESOLVED | Noted: 2022-03-07 | Resolved: 2022-03-23

## 2022-03-23 PROCEDURE — 99214 OFFICE O/P EST MOD 30 MIN: CPT | Mod: PBBFAC,27,PO | Performed by: NURSE PRACTITIONER

## 2022-03-23 PROCEDURE — 99999 PR PBB SHADOW E&M-EST. PATIENT-LVL IV: CPT | Mod: PBBFAC,,, | Performed by: NURSE PRACTITIONER

## 2022-03-23 PROCEDURE — 3079F PR MOST RECENT DIASTOLIC BLOOD PRESSURE 80-89 MM HG: ICD-10-PCS | Mod: CPTII,,, | Performed by: NURSE PRACTITIONER

## 2022-03-23 PROCEDURE — 1159F PR MEDICATION LIST DOCUMENTED IN MEDICAL RECORD: ICD-10-PCS | Mod: CPTII,,, | Performed by: NURSE PRACTITIONER

## 2022-03-23 PROCEDURE — 99999 PR PBB SHADOW E&M-EST. PATIENT-LVL IV: ICD-10-PCS | Mod: PBBFAC,,, | Performed by: NURSE PRACTITIONER

## 2022-03-23 PROCEDURE — 1160F RVW MEDS BY RX/DR IN RCRD: CPT | Mod: CPTII,,, | Performed by: NURSE PRACTITIONER

## 2022-03-23 PROCEDURE — 1159F MED LIST DOCD IN RCRD: CPT | Mod: CPTII,,, | Performed by: NURSE PRACTITIONER

## 2022-03-23 PROCEDURE — 99999 PR PBB SHADOW E&M-EST. PATIENT-LVL III: CPT | Mod: PBBFAC,,, | Performed by: NURSE PRACTITIONER

## 2022-03-23 PROCEDURE — 3079F DIAST BP 80-89 MM HG: CPT | Mod: CPTII,,, | Performed by: NURSE PRACTITIONER

## 2022-03-23 PROCEDURE — 3008F BODY MASS INDEX DOCD: CPT | Mod: CPTII,,, | Performed by: NURSE PRACTITIONER

## 2022-03-23 PROCEDURE — 3044F PR MOST RECENT HEMOGLOBIN A1C LEVEL <7.0%: ICD-10-PCS | Mod: CPTII,,, | Performed by: NURSE PRACTITIONER

## 2022-03-23 PROCEDURE — 3078F DIAST BP <80 MM HG: CPT | Mod: CPTII,,, | Performed by: NURSE PRACTITIONER

## 2022-03-23 PROCEDURE — 3078F PR MOST RECENT DIASTOLIC BLOOD PRESSURE < 80 MM HG: ICD-10-PCS | Mod: CPTII,,, | Performed by: NURSE PRACTITIONER

## 2022-03-23 PROCEDURE — 99212 PR OFFICE/OUTPT VISIT, EST, LEVL II, 10-19 MIN: ICD-10-PCS | Mod: S$PBB,,, | Performed by: NURSE PRACTITIONER

## 2022-03-23 PROCEDURE — 3074F PR MOST RECENT SYSTOLIC BLOOD PRESSURE < 130 MM HG: ICD-10-PCS | Mod: CPTII,,, | Performed by: NURSE PRACTITIONER

## 2022-03-23 PROCEDURE — 3044F HG A1C LEVEL LT 7.0%: CPT | Mod: CPTII,,, | Performed by: NURSE PRACTITIONER

## 2022-03-23 PROCEDURE — 3008F PR BODY MASS INDEX (BMI) DOCUMENTED: ICD-10-PCS | Mod: CPTII,,, | Performed by: NURSE PRACTITIONER

## 2022-03-23 PROCEDURE — 3074F SYST BP LT 130 MM HG: CPT | Mod: CPTII,,, | Performed by: NURSE PRACTITIONER

## 2022-03-23 PROCEDURE — 1160F PR REVIEW ALL MEDS BY PRESCRIBER/CLIN PHARMACIST DOCUMENTED: ICD-10-PCS | Mod: CPTII,,, | Performed by: NURSE PRACTITIONER

## 2022-03-23 PROCEDURE — 99999 PR PBB SHADOW E&M-EST. PATIENT-LVL III: ICD-10-PCS | Mod: PBBFAC,,, | Performed by: NURSE PRACTITIONER

## 2022-03-23 PROCEDURE — 99213 OFFICE O/P EST LOW 20 MIN: CPT | Mod: PBBFAC,PO,25 | Performed by: NURSE PRACTITIONER

## 2022-03-23 PROCEDURE — 99214 PR OFFICE/OUTPT VISIT, EST, LEVL IV, 30-39 MIN: ICD-10-PCS | Mod: S$PBB,,, | Performed by: NURSE PRACTITIONER

## 2022-03-23 PROCEDURE — 99212 OFFICE O/P EST SF 10 MIN: CPT | Mod: S$PBB,,, | Performed by: NURSE PRACTITIONER

## 2022-03-23 PROCEDURE — 99214 OFFICE O/P EST MOD 30 MIN: CPT | Mod: S$PBB,,, | Performed by: NURSE PRACTITIONER

## 2022-03-23 RX ORDER — MUPIROCIN 20 MG/G
OINTMENT TOPICAL 3 TIMES DAILY
Qty: 30 G | Refills: 0 | Status: SHIPPED | OUTPATIENT
Start: 2022-03-23 | End: 2022-04-02

## 2022-03-23 RX ORDER — SULFAMETHOXAZOLE AND TRIMETHOPRIM 800; 160 MG/1; MG/1
1 TABLET ORAL 2 TIMES DAILY
Qty: 14 TABLET | Refills: 0 | Status: SHIPPED | OUTPATIENT
Start: 2022-03-23 | End: 2022-03-30

## 2022-03-23 NOTE — PROGRESS NOTES
Subjective:       Patient ID: Ivana Blair is a 37 y.o. female.    Chief Complaint:  Follow-up      History of Present Illness  HPI  G0 present for f/u and to establish care; was previously seeing Dr. Thompson  2.9cm left ovarian complex cyst 2021  Hx of hyperprolactinemia; prolactin has been fluctuating normal as of last week  dostinex twice a week  Taking apri monthly for low estrogen per endocrinology  Taking them regularly every morning  Irregular cycles  Interested in pregnancy, but does not have a partner currently    GYN & OB History  No LMP recorded. (Menstrual status: Other).   Date of Last Pap: 2021    OB History    Para Term  AB Living   0 0 0 0 0 0   SAB IAB Ectopic Multiple Live Births   0 0 0 0         Review of Systems  Review of Systems   Genitourinary: Positive for menstrual problem. Negative for pelvic pain.   Integumentary:  Negative for nipple discharge.   Neurological: Negative for headaches.   All other systems reviewed and are negative.  Breast: Negative for nipple discharge          Objective:      Physical Exam:   Constitutional: She is oriented to person, place, and time. She appears well-developed and well-nourished. No distress.    HENT:   Head: Normocephalic and atraumatic.    Eyes: Pupils are equal, round, and reactive to light. Conjunctivae and EOM are normal.      Pulmonary/Chest: Effort normal.                  Musculoskeletal: Normal range of motion and moves all extremeties.       Neurological: She is alert and oriented to person, place, and time.    Skin: Skin is warm and dry. No rash noted. She is not diaphoretic. No erythema. No pallor.    Psychiatric: She has a normal mood and affect. Her behavior is normal. Judgment and thought content normal.             Assessment:        1. Left ovarian cyst    2. Screening for malignant neoplasm of cervix               Plan:   U/s scheduled  Continue OCPs  Discussed possible need for IVF for pregnancy if cycles  remain irregular    Continue annual well woman exam.    Left ovarian cyst  -     US Pelvis Comp with Transvag NON-OB (xpd; Future; Expected date: 03/23/2022    Screening for malignant neoplasm of cervix  -     Ambulatory referral/consult to Obstetrics / Gynecology

## 2022-03-23 NOTE — PROGRESS NOTES
"Subjective:       Patient ID: Ivana Blair is a 37 y.o. female.    Chief Complaint: Recurrent Skin Infections    Patient here for abscess to right axilla x 1 day  It drained a small amount of purulent drainage  No fever  Area is tender  It is her first boil  Has hx of vit d deficiency, on supplementation  Has hx of prolactinoma. Followed by outside endo  Had gastric sleeve in Jan of this year      /86   Pulse 92   Temp 97.4 °F (36.3 °C)   Ht 5' 7" (1.702 m)   Wt 114.5 kg (252 lb 6.8 oz)   BMI 39.54 kg/m²     Review of Systems   Constitutional: Negative for activity change, appetite change, chills, diaphoresis, fatigue, fever and unexpected weight change.   HENT: Negative.    Respiratory: Negative for cough and shortness of breath.    Cardiovascular: Negative for chest pain, palpitations and leg swelling.   Gastrointestinal: Negative.    Genitourinary: Negative.    Musculoskeletal: Negative.    Skin: Positive for color change and wound. Negative for pallor and rash.   Allergic/Immunologic: Negative for immunocompromised state.   Neurological: Negative.  Negative for dizziness and facial asymmetry.   Hematological: Negative for adenopathy. Does not bruise/bleed easily.   Psychiatric/Behavioral: Negative for agitation, behavioral problems and confusion.       Objective:      Physical Exam  Vitals and nursing note reviewed.   Constitutional:       General: She is not in acute distress.     Appearance: She is well-developed. She is not diaphoretic.   HENT:      Head: Normocephalic and atraumatic.   Cardiovascular:      Rate and Rhythm: Normal rate and regular rhythm.      Heart sounds: Normal heart sounds. No murmur heard.  Pulmonary:      Effort: Pulmonary effort is normal. No respiratory distress.      Breath sounds: Normal breath sounds.   Musculoskeletal:         General: Normal range of motion.   Skin:     General: Skin is warm and dry.      Findings: No rash.      Comments: Right axilla with " nickel sized boil. Area is red, warm, tender. Small amount of purulent drainage expressed manually by me   Neurological:      Mental Status: She is alert.   Psychiatric:         Behavior: Behavior normal. Behavior is cooperative.         Thought Content: Thought content normal.         Judgment: Judgment normal.         Assessment:       1. Furuncle of right axilla    2. Vitamin D insufficiency    3. History of prolactinoma    4. Morbid obesity    5. History of sleeve gastrectomy        Plan:       Ivana was seen today for recurrent skin infections.    Diagnoses and all orders for this visit:    Furuncle of right axilla  -     mupirocin (BACTROBAN) 2 % ointment; Apply topically 3 (three) times daily. for 10 days  -     sulfamethoxazole-trimethoprim 800-160mg (BACTRIM DS) 800-160 mg Tab; Take 1 tablet by mouth 2 (two) times daily. for 7 days  Keep wound clean and dry.  Apply warm compresses four times daily to the wound to help draw out infection.  Take antibiotic (Bactrim) as prescribed for full course of treatment.  Bactroban ointment three times day.  Keep wound covered when going outside or working.  Follow up if not improving/worse    Vitamin D insufficiency  Noted on recent labs, on supplementation    History of prolactinoma  Stable. Followed by outside endo    obesity  Stable. S/p gastric sleeve, weight coming down    History of sleeve gastrectomy  Stable. Weight coming down, surgery 1/2022

## 2022-06-01 ENCOUNTER — PATIENT MESSAGE (OUTPATIENT)
Dept: ORTHOPEDICS | Facility: CLINIC | Age: 37
End: 2022-06-01
Payer: MEDICAID

## 2022-06-01 ENCOUNTER — OFFICE VISIT (OUTPATIENT)
Dept: DERMATOLOGY | Facility: CLINIC | Age: 37
End: 2022-06-01
Payer: COMMERCIAL

## 2022-06-01 DIAGNOSIS — L81.9 DYSCHROMIA: Primary | ICD-10-CM

## 2022-06-01 DIAGNOSIS — M25.559 HIP PAIN: Primary | ICD-10-CM

## 2022-06-01 DIAGNOSIS — L83 CONFLUENT AND RETICULATED PAPILLOMATOSIS (CARP): ICD-10-CM

## 2022-06-01 DIAGNOSIS — L91.0 KELOID SCAR OF SKIN: ICD-10-CM

## 2022-06-01 PROCEDURE — 11900 INJECT SKIN LESIONS </W 7: CPT | Mod: S$GLB,,, | Performed by: PHYSICIAN ASSISTANT

## 2022-06-01 PROCEDURE — 99999 PR PBB SHADOW E&M-EST. PATIENT-LVL III: ICD-10-PCS | Mod: PBBFAC,,, | Performed by: PHYSICIAN ASSISTANT

## 2022-06-01 PROCEDURE — 1159F PR MEDICATION LIST DOCUMENTED IN MEDICAL RECORD: ICD-10-PCS | Mod: CPTII,S$GLB,, | Performed by: PHYSICIAN ASSISTANT

## 2022-06-01 PROCEDURE — 99203 OFFICE O/P NEW LOW 30 MIN: CPT | Mod: 25,S$GLB,, | Performed by: PHYSICIAN ASSISTANT

## 2022-06-01 PROCEDURE — 1159F MED LIST DOCD IN RCRD: CPT | Mod: CPTII,S$GLB,, | Performed by: PHYSICIAN ASSISTANT

## 2022-06-01 PROCEDURE — 3044F PR MOST RECENT HEMOGLOBIN A1C LEVEL <7.0%: ICD-10-PCS | Mod: CPTII,S$GLB,, | Performed by: PHYSICIAN ASSISTANT

## 2022-06-01 PROCEDURE — 99203 PR OFFICE/OUTPT VISIT, NEW, LEVL III, 30-44 MIN: ICD-10-PCS | Mod: 25,S$GLB,, | Performed by: PHYSICIAN ASSISTANT

## 2022-06-01 PROCEDURE — 1160F RVW MEDS BY RX/DR IN RCRD: CPT | Mod: CPTII,S$GLB,, | Performed by: PHYSICIAN ASSISTANT

## 2022-06-01 PROCEDURE — 1160F PR REVIEW ALL MEDS BY PRESCRIBER/CLIN PHARMACIST DOCUMENTED: ICD-10-PCS | Mod: CPTII,S$GLB,, | Performed by: PHYSICIAN ASSISTANT

## 2022-06-01 PROCEDURE — 11900 PR INJECTION INTO SKIN LESIONS, UP TO 7: ICD-10-PCS | Mod: S$GLB,,, | Performed by: PHYSICIAN ASSISTANT

## 2022-06-01 PROCEDURE — 99999 PR PBB SHADOW E&M-EST. PATIENT-LVL III: CPT | Mod: PBBFAC,,, | Performed by: PHYSICIAN ASSISTANT

## 2022-06-01 PROCEDURE — 3044F HG A1C LEVEL LT 7.0%: CPT | Mod: CPTII,S$GLB,, | Performed by: PHYSICIAN ASSISTANT

## 2022-06-01 RX ORDER — TRIAMCINOLONE ACETONIDE 40 MG/ML
20 INJECTION, SUSPENSION INTRA-ARTICULAR; INTRAMUSCULAR
Status: DISCONTINUED | OUTPATIENT
Start: 2022-06-01 | End: 2022-06-02

## 2022-06-01 RX ORDER — HYDROQUINONE 40 MG/G
CREAM TOPICAL
Qty: 30 G | Refills: 0 | Status: SHIPPED | OUTPATIENT
Start: 2022-06-01

## 2022-06-01 RX ORDER — LIDOCAINE AND PRILOCAINE 25; 25 MG/G; MG/G
CREAM TOPICAL
Qty: 30 G | Refills: 0 | Status: SHIPPED | OUTPATIENT
Start: 2022-06-01

## 2022-06-01 NOTE — PROGRESS NOTES
"  Subjective:       Patient ID:  Ivana Blair is a 37 y.o. female who presents for   Chief Complaint   Patient presents with    Keloid     C/o of keloids under breast and lower legs     History of Present Illness: The patient presents with chief complaint of keloids.  Location: bilateral breasts   Duration: 1 year  Signs/Symptoms: thickened scars (s/p breast reduction surgery one year ago). Some tenderness / tightness. States her surgeon wanted to do a revision, but she declined.    Prior treatments: none    C/o thickened scars of bilateral lower legs after getting cut while shaving.     C/o dark, black head like bumps of mid chest, "granular like", has been able to express some discharge of bumps in the past, but now unable to express and concerned for worsening of scarring and discoloration.    PMHX: pituitary tumor, obesity (s/p gastric surgery), s/p breast reduction      Review of Systems   Constitutional: Negative for fever and chills.   Gastrointestinal: Negative for nausea and vomiting.   Skin: Positive for activity-related sunscreen use. Negative for itching, rash, dry skin, sun sensitivity, daily sunscreen use, recent sunburn, dry lips and abscesses.   Hematologic/Lymphatic: Does not bruise/bleed easily.        Objective:    Physical Exam   Constitutional: She appears well-developed and well-nourished. No distress.   Neurological: She is alert and oriented to person, place, and time. She is not disoriented.   Psychiatric: She has a normal mood and affect.   Skin:   Areas Examined (abnormalities noted in diagram):   Head / Face Inspection Performed  Neck Inspection Performed  Chest / Axilla Inspection Performed  Abdomen Inspection Performed  Back Inspection Performed  RUE Inspected  LUE Inspection Performed  RLE Inspected  LLE Inspection Performed              Diagram Legend     Erythematous scaling macule/papule c/w actinic keratosis       Vascular papule c/w angioma      Pigmented verrucoid " papule/plaque c/w seborrheic keratosis      Yellow umbilicated papule c/w sebaceous hyperplasia      Irregularly shaped tan macule c/w lentigo     1-2 mm smooth white papules consistent with Milia      Movable subcutaneous cyst with punctum c/w epidermal inclusion cyst      Subcutaneous movable cyst c/w pilar cyst      Firm pink to brown papule c/w dermatofibroma      Pedunculated fleshy papule(s) c/w skin tag(s)      Evenly pigmented macule c/w junctional nevus     Mildly variegated pigmented, slightly irregular-bordered macule c/w mildly atypical nevus      Flesh colored to evenly pigmented papule c/w intradermal nevus       Pink pearly papule/plaque c/w basal cell carcinoma      Erythematous hyperkeratotic cursted plaque c/w SCC      Surgical scar with no sign of skin cancer recurrence      Open and closed comedones      Inflammatory papules and pustules      Verrucoid papule consistent consistent with wart     Erythematous eczematous patches and plaques     Dystrophic onycholytic nail with subungual debris c/w onychomycosis     Umbilicated papule    Erythematous-base heme-crusted tan verrucoid plaque consistent with inflamed seborrheic keratosis     Erythematous Silvery Scaling Plaque c/w Psoriasis     See annotation      Assessment / Plan:        Dyschromia  Discussed most likely secondary changes by exam today. Encouraged photoprotection. Monitor.    Confluent and reticulated papillomatosis (CARP)  Mild, pt denies itching. Would reconsider trial of topical retinoid in future and possibly amlactin.    Keloid scar of skin  Discussed dx and tx options. Agree to ILK # 1 today. Will send rx for EMLA cream and advised to apply 30 minutes prior to next scheduled injection appointment.  Intralesional Kenalog 20 mg/cc (0.6 cc total) injected into < 7 keloid lesions on the left chest today after obtaining verbal consent including risk of surrounding hypopigmentation. Patient tolerated procedure well.    Units: 1  NDC  for Kenalog 10mg/cc:  7367-5150-63    Scar  PIPA  Reassurance provided, + hyperpigmenation of left ankle. Agree to trial of fade cream. Trial of HQ 6% cream qd prn.  Patient understands not to use HQ for longer than 3 months due to risk of worsening discoloration (blue-grey discoloration). Must wear daily sunscreen with minimum SPF 30 and broad spectrum coverage.       Follow up in about 4 weeks (around 6/29/2022) for keloid.

## 2022-06-02 ENCOUNTER — PATIENT MESSAGE (OUTPATIENT)
Dept: ORTHOPEDICS | Facility: CLINIC | Age: 37
End: 2022-06-02

## 2022-06-02 ENCOUNTER — HOSPITAL ENCOUNTER (OUTPATIENT)
Dept: RADIOLOGY | Facility: HOSPITAL | Age: 37
Discharge: HOME | End: 2022-06-02
Attending: ORTHOPAEDIC SURGERY
Payer: COMMERCIAL

## 2022-06-02 ENCOUNTER — TELEPHONE (OUTPATIENT)
Dept: NEUROLOGY | Facility: CLINIC | Age: 37
End: 2022-06-02
Payer: MEDICAID

## 2022-06-02 DIAGNOSIS — M25.559 HIP PAIN: ICD-10-CM

## 2022-06-02 PROCEDURE — 73522 X-RAY EXAM HIPS BI 3-4 VIEWS: CPT | Mod: 26,,, | Performed by: RADIOLOGY

## 2022-06-02 PROCEDURE — 73522 XR HIP 3 OR 4 VIEWS BILATERAL: ICD-10-PCS | Mod: 26,,, | Performed by: RADIOLOGY

## 2022-06-02 PROCEDURE — 73522 X-RAY EXAM HIPS BI 3-4 VIEWS: CPT | Mod: TC

## 2022-06-04 ENCOUNTER — PATIENT MESSAGE (OUTPATIENT)
Dept: ADMINISTRATIVE | Facility: OTHER | Age: 37
End: 2022-06-04
Payer: MEDICAID

## 2022-06-06 ENCOUNTER — PATIENT MESSAGE (OUTPATIENT)
Dept: ORTHOPEDICS | Facility: CLINIC | Age: 37
End: 2022-06-06
Payer: MEDICAID

## 2022-06-06 ENCOUNTER — TELEPHONE (OUTPATIENT)
Dept: ORTHOPEDICS | Facility: CLINIC | Age: 37
End: 2022-06-06
Payer: MEDICAID

## 2022-06-21 NOTE — TELEPHONE ENCOUNTER
----- Message from Julio Castro sent at 3/29/2017  2:05 PM CDT -----  Briana ([facility] ) is requesting a call from nurse to make a change in to pt prescription from a cream to a foam.                Please call Melissa ( [facility] ) back at [phone] ext [phone]   Reena Sim

## 2022-07-18 ENCOUNTER — TELEPHONE (OUTPATIENT)
Dept: DERMATOLOGY | Facility: CLINIC | Age: 37
End: 2022-07-18
Payer: COMMERCIAL

## 2022-07-18 ENCOUNTER — PATIENT MESSAGE (OUTPATIENT)
Dept: DERMATOLOGY | Facility: CLINIC | Age: 37
End: 2022-07-18
Payer: COMMERCIAL

## 2022-07-18 NOTE — TELEPHONE ENCOUNTER
Called pt to reschedule appt with Raven Boyle, provider won't be in office on 8/8/22. No answer. Phone states the wireless customer is not available.  Will send message on [employer].

## 2022-08-11 ENCOUNTER — TELEPHONE (OUTPATIENT)
Dept: INTERNAL MEDICINE | Facility: CLINIC | Age: 37
End: 2022-08-11
Payer: COMMERCIAL

## 2022-08-11 ENCOUNTER — PATIENT MESSAGE (OUTPATIENT)
Dept: DERMATOLOGY | Facility: CLINIC | Age: 37
End: 2022-08-11
Payer: COMMERCIAL

## 2022-08-11 NOTE — TELEPHONE ENCOUNTER
----- Message from Paige Skaggs sent at 8/11/2022  4:11 PM CDT -----  Contact: Ivana Heath would like to Transfer back to Dr. Moore and have her as her PCP again. Please call her with any questions at [phone] or a message can be sent through the Portal as well.

## 2022-09-09 ENCOUNTER — TELEPHONE (OUTPATIENT)
Dept: ORTHOPEDICS | Facility: CLINIC | Age: 37
End: 2022-09-09
Payer: COMMERCIAL

## 2022-09-09 NOTE — TELEPHONE ENCOUNTER
Attempted to contact pt to r/s appointment as Elizabeth Morales PA-C is not taking new patients at this time, but no answer, no voicemail.

## 2022-09-14 ENCOUNTER — TELEPHONE (OUTPATIENT)
Dept: ORTHOPEDICS | Facility: CLINIC | Age: 37
End: 2022-09-14
Payer: COMMERCIAL

## 2022-09-15 ENCOUNTER — PATIENT MESSAGE (OUTPATIENT)
Dept: ORTHOPEDICS | Facility: CLINIC | Age: 37
End: 2022-09-15
Payer: COMMERCIAL

## 2022-09-15 ENCOUNTER — TELEPHONE (OUTPATIENT)
Dept: SPORTS MEDICINE | Facility: CLINIC | Age: 37
End: 2022-09-15
Payer: COMMERCIAL

## 2022-09-15 DIAGNOSIS — M25.511 RIGHT SHOULDER PAIN, UNSPECIFIED CHRONICITY: Primary | ICD-10-CM

## 2022-10-07 DIAGNOSIS — M25.511 RIGHT SHOULDER PAIN, UNSPECIFIED CHRONICITY: Primary | ICD-10-CM

## 2022-10-10 ENCOUNTER — TELEPHONE (OUTPATIENT)
Dept: SPORTS MEDICINE | Facility: CLINIC | Age: 37
End: 2022-10-10
Payer: COMMERCIAL

## 2022-10-10 NOTE — TELEPHONE ENCOUNTER
Patient requested she been seen by a provider at the [facility]. Spoke with patient to schedule office visit. Patient accepted and verbalized understanding.

## 2022-10-11 ENCOUNTER — HOSPITAL ENCOUNTER (OUTPATIENT)
Dept: RADIOLOGY | Facility: HOSPITAL | Age: 37
Discharge: HOME | End: 2022-10-11
Attending: FAMILY MEDICINE
Payer: COMMERCIAL

## 2022-10-11 DIAGNOSIS — M25.511 RIGHT SHOULDER PAIN, UNSPECIFIED CHRONICITY: ICD-10-CM

## 2022-10-11 PROCEDURE — 73030 X-RAY EXAM OF SHOULDER: CPT | Mod: 26,RT,, | Performed by: RADIOLOGY

## 2022-10-11 PROCEDURE — 73030 XR SHOULDER COMPLETE 2 OR MORE VIEWS RIGHT: ICD-10-PCS | Mod: 26,RT,, | Performed by: RADIOLOGY

## 2022-10-11 PROCEDURE — 73030 X-RAY EXAM OF SHOULDER: CPT | Mod: TC,FY,PO,RT

## 2022-10-12 ENCOUNTER — OFFICE VISIT (OUTPATIENT)
Dept: ORTHOPEDICS | Facility: CLINIC | Age: 37
End: 2022-10-12
Payer: COMMERCIAL

## 2022-10-12 DIAGNOSIS — M75.41 IMPINGEMENT SYNDROME OF RIGHT SHOULDER: Primary | ICD-10-CM

## 2022-10-12 DIAGNOSIS — M19.011 ARTHRITIS OF RIGHT ACROMIOCLAVICULAR JOINT: ICD-10-CM

## 2022-10-12 DIAGNOSIS — M67.911 TENDINOPATHY OF RIGHT ROTATOR CUFF: ICD-10-CM

## 2022-10-12 PROCEDURE — 1159F PR MEDICATION LIST DOCUMENTED IN MEDICAL RECORD: ICD-10-PCS | Mod: CPTII,S$GLB,, | Performed by: STUDENT IN AN ORGANIZED HEALTH CARE EDUCATION/TRAINING PROGRAM

## 2022-10-12 PROCEDURE — 1160F RVW MEDS BY RX/DR IN RCRD: CPT | Mod: CPTII,S$GLB,, | Performed by: STUDENT IN AN ORGANIZED HEALTH CARE EDUCATION/TRAINING PROGRAM

## 2022-10-12 PROCEDURE — 99999 PR PBB SHADOW E&M-EST. PATIENT-LVL III: CPT | Mod: PBBFAC,,, | Performed by: STUDENT IN AN ORGANIZED HEALTH CARE EDUCATION/TRAINING PROGRAM

## 2022-10-12 PROCEDURE — 1160F PR REVIEW ALL MEDS BY PRESCRIBER/CLIN PHARMACIST DOCUMENTED: ICD-10-PCS | Mod: CPTII,S$GLB,, | Performed by: STUDENT IN AN ORGANIZED HEALTH CARE EDUCATION/TRAINING PROGRAM

## 2022-10-12 PROCEDURE — 99213 OFFICE O/P EST LOW 20 MIN: CPT | Mod: 25,S$GLB,, | Performed by: STUDENT IN AN ORGANIZED HEALTH CARE EDUCATION/TRAINING PROGRAM

## 2022-10-12 PROCEDURE — 3044F HG A1C LEVEL LT 7.0%: CPT | Mod: CPTII,S$GLB,, | Performed by: STUDENT IN AN ORGANIZED HEALTH CARE EDUCATION/TRAINING PROGRAM

## 2022-10-12 PROCEDURE — 1159F MED LIST DOCD IN RCRD: CPT | Mod: CPTII,S$GLB,, | Performed by: STUDENT IN AN ORGANIZED HEALTH CARE EDUCATION/TRAINING PROGRAM

## 2022-10-12 PROCEDURE — 20611 DRAIN/INJ JOINT/BURSA W/US: CPT | Mod: RT,S$GLB,, | Performed by: STUDENT IN AN ORGANIZED HEALTH CARE EDUCATION/TRAINING PROGRAM

## 2022-10-12 PROCEDURE — 99999 PR PBB SHADOW E&M-EST. PATIENT-LVL III: ICD-10-PCS | Mod: PBBFAC,,, | Performed by: STUDENT IN AN ORGANIZED HEALTH CARE EDUCATION/TRAINING PROGRAM

## 2022-10-12 PROCEDURE — 20611 LARGE JOINT ASPIRATION/INJECTION: R SUBACROMIAL BURSA: ICD-10-PCS | Mod: RT,S$GLB,, | Performed by: STUDENT IN AN ORGANIZED HEALTH CARE EDUCATION/TRAINING PROGRAM

## 2022-10-12 PROCEDURE — 3044F PR MOST RECENT HEMOGLOBIN A1C LEVEL <7.0%: ICD-10-PCS | Mod: CPTII,S$GLB,, | Performed by: STUDENT IN AN ORGANIZED HEALTH CARE EDUCATION/TRAINING PROGRAM

## 2022-10-12 PROCEDURE — 99213 PR OFFICE/OUTPT VISIT, EST, LEVL III, 20-29 MIN: ICD-10-PCS | Mod: 25,S$GLB,, | Performed by: STUDENT IN AN ORGANIZED HEALTH CARE EDUCATION/TRAINING PROGRAM

## 2022-10-12 RX ORDER — BETAMETHASONE SODIUM PHOSPHATE AND BETAMETHASONE ACETATE 3; 3 MG/ML; MG/ML
6 INJECTION, SUSPENSION INTRA-ARTICULAR; INTRALESIONAL; INTRAMUSCULAR; SOFT TISSUE
Status: DISCONTINUED | OUTPATIENT
Start: 2022-10-12 | End: 2022-10-12 | Stop reason: HOSPADM

## 2022-10-12 RX ADMIN — BETAMETHASONE SODIUM PHOSPHATE AND BETAMETHASONE ACETATE 6 MG: 3; 3 INJECTION, SUSPENSION INTRA-ARTICULAR; INTRALESIONAL; INTRAMUSCULAR; SOFT TISSUE at 02:10

## 2022-10-12 NOTE — PROGRESS NOTES
Patient ID: Ivana Blair  YOB: 1985  MRN: [id number]    Chief Complaint: Pain of the Right Shoulder      Referred By: Self    Occupation:       History of Present Illness: Ivana Blair is a right-hand dominant 37 y.o. female who presents today with 7/10 pain c/o right shoulder pain. She states it began in July 2022 without injury.  For her employment she does a lot of reaching, climbing, lifting, and reaching into her back seat.  She also carries a large purse that she feels may have contributed to the onset of her pain.  During an episode of pain recently, she describes a raised, bumpy rash that appeared on her arm the following morning. The rash leaves discoloration once the inflammation decreases. Her dermatologist told her it was related to inflammation.  This has happened twice, first a small lesion and now a larger one.  It is not painful.  She describes the shoulder pain as an intermittent sharp and throbbing pain.  It starts in the shoulder and radiates to her neck and shoulder blade.  She has been using ice/heat alternation for relief. She states that driving and reaching backwards makes the pain worse.       Past Medical History:   Past Medical History:   Diagnosis Date    Allergy     sinus    Anxiety     B12 deficiency     Benign prolactinoma     s/p surgery    Depression     Hx of varicella     Insulin resistance 5/2/2019    Left wrist pain     Dr. Lam    Pituitary tumor     Vitamin D deficiency      Past Surgical History:   Procedure Laterality Date    ADENOIDECTOMY      2007    ADENOIDECTOMY  2007    BRAIN SURGERY  2015    COSMETIC SURGERY  03/31/2021    Breast Reduction (if that counts)    DENTAL SURGERY      LAPAROSCOPIC SLEEVE GASTRECTOMY  2022    NASAL JOSE MIGUEL BULLOSA RESECTION      2013    TENDON RELEASE Left     wrist    TONSILLECTOMY      2007    TOTAL REDUCTION MAMMOPLASTY      TUMOR REMOVAL  02/25/2015    Prolactinoma resected by Luz      Family History   Problem Relation Age of Onset    Anemia Mother     Gout Father     Anemia Sister     Alzheimer's disease Maternal Grandmother     Diabetes Maternal Grandmother     Early death Maternal Grandmother     Cancer Maternal Grandfather     Cataracts Maternal Grandfather     Hypertension Maternal Grandfather     Rheumatologic disease Paternal Grandmother     Cancer Paternal Grandfather         Lung Cancer    Heart disease Maternal Aunt     Arthritis Maternal Aunt     Stroke Maternal Uncle     Breast cancer Neg Hx     Ovarian cancer Neg Hx     Colon cancer Neg Hx      Social History     Socioeconomic History    Marital status: Single    Number of children: 0   Occupational History    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 No    Breast-feeding No   Social History Narrative    Wears a seatbelt.     Social Determinants of Health     Transportation Needs: Unknown    Lack of Transportation (Non-Medical): Patient refused   Physical Activity: Sufficiently Active    Days of Exercise per Week: 7 days    Minutes of Exercise per Session: 60 min   Stress: Stress Concern Present    Feeling of Stress : To some extent   Social Connections: Unknown    Frequency of Communication with Friends and Family: More than three times a week    Frequency of Social Gatherings with Friends and Family: Twice a week    Active Member of Clubs or Organizations: Yes    Attends Club or Organization Meetings: 1 to 4 times per year    Marital Status: Patient refused   Housing Stability: Unknown    Unable to Pay for Housing in the Last Year: Patient refused    Unstable Housing in the Last Year: Patient refused     Medication List  with Changes/Refills   Current Medications    CABERGOLINE (DOSTINEX) 0.5 MG TABLET    Take one  tablet twice a week    CHOLECALCIFEROL, VITAMIN D3, 50 MCG (2,000 UNIT) CHEW        DESOGESTREL-ETHINYL ESTRADIOL (APRI) 0.15-0.03 MG PER TABLET    Take 1 tablet by mouth once daily.    HYDROQUINONE 4 % CREA    AAA of dark spots qd prn. Do not use for longer than 3 months.    LIDOCAINE-PRILOCAINE (EMLA) CREAM    Apply topically as needed (30 minutes prior to appointment for keloid.).    MULTIVITAMIN CAPSULE    Take 1 capsule by mouth once daily.    NASCOBAL 500 MCG/SPRAY NASAL SPRAY    every 7 days.     Review of patient's allergies indicates:   Allergen Reactions    Pollen,fermented Itching, Anxiety, Dermatitis, Other (See Comments), Rash and Shortness Of Breath    Pecan nut Itching and Other (See Comments)       Physical Exam:   There is no height or weight on file to calculate BMI.    Physical Exam  Constitutional:       General: She is not in acute distress.     Appearance: Normal appearance.   HENT:      Head: Normocephalic and atraumatic.   Eyes:      Extraocular Movements: Extraocular movements intact.      Conjunctiva/sclera: Conjunctivae normal.   Pulmonary:      Effort: Pulmonary effort is normal. No respiratory distress.   Skin:     General: Skin is warm and dry.   Neurological:      General: No focal deficit present.      Mental Status: She is alert and oriented to person, place, and time.   Psychiatric:         Mood and Affect: Mood normal.         Detailed MSK exam:     Right Shoulder:  Inspection: Patchy dermal discoloration mid anterior upper arm  Palpation: Tenderness to palpation AC joint  Range of motion: 180 deg Flexion         85 deg External Rotation in Adduction         IR to Upper Thoracic  Strength:  4/5 Abduction pain limited    4/5 External Rotation in Adduction pain limited    5/5 Internal Rotation   Special Tests: Positive Mosher-Yunior    Mild positive Speed's    Positive  Brevard's  Positive Empty Can/Full Can   No pain with AB/ER  N/V Exam:  Radial: Normal motor (EPL/thumbs up)              Normal sensory (dorsal hand)   Median: Normal motor (FPL/A-OK)      Normal sensory (thumb)   Ulnar:  Normal motor (Interossei/scissors-spread)     Normal sensory (5th finger)   LABC: Normal sensory (lateral forearm)   MABC: Normal sensory (medial forearm)   MC: Normal motor (elbow flexion)   Axillary: Normal motor/sensory (deltoid)  Normal radial and ulnar pulses, warm and well perfused with capillary refill < 2 sec         Imaging:  X-ray Shoulder 2 or More Views Right  Narrative: EXAMINATION:  XR SHOULDER COMPLETE 2 OR MORE VIEWS RIGHT    CLINICAL HISTORY:  Pain in right shoulder    TECHNIQUE:  Two or three views of the right shoulder were performed.    COMPARISON:  None    FINDINGS:  Glenohumeral joint is maintained.  Degenerative changes at the AC joint with moderate inferior marginal spurring is noted.  No acute fracture or dislocation.  Soft tissues are normal.  The visualized lung zones are clear.  Impression: See above    Electronically signed by: Juan Antonio Amos MD  Date:    10/11/2022  Time:    08:01      Relevant imaging results were reviewed and interpreted by me and per my read: Mild degenerative changes of the right AC joint. No significant degenerative changes of the GH joint. Mildly high riding humeral head in relation to the glenoid, suggestive of chronic rotator cuff injury.  No fractures or other acute abnormalities.    This was discussed with the patient and / or family today.       Patient Instructions   Assessment:  Ivana Blair is a 37 y.o. female with a chief complaint of Pain of the Right Shoulder      Encounter Diagnoses   Name Primary?    Impingement syndrome of right shoulder Yes    Tendinopathy of right rotator cuff     Arthritis of right acromioclavicular joint         Plan:  XR reviewed today, relatively unremarkable, with some minor degenerative changes.  Labs  reviewed, A1c 5.3% with a history of prediabetes, GFR > 60  The patient's history, clinical exam, and imaging findings are consistent with subacromial impingement, rotator cuff tendinopathy, and AC joint arthrosis.   We have reviewed the natural history of this disorder and discussed treatment options.   We recommend conservative treatment.    Cortisone injection today to subacromial space 2/2/1 Celestone under US guidance  Order PT at [facility] per patient request - 2-3x per week for 6-8 weeks     Follow-up: 3 months or sooner if there are any problems between now and then.    Thank you for choosing [facility] and Dr. Fredi Roth for your orthopedic & sports medicine care. It is our goal to provide you with exceptional care that will help keep you healthy, active, and get you back in the game.    Please do not hesitate to reach out to us via email, phone, or MyChart with any questions, concerns, or feedback.    If you are experiencing pain/discomfort ,or have questions after 5pm and would like to be connected to the [facility] on-call team, please call this number and specify which Sports Medicine provider is treating you: [phone]          A copy of today's visit note has been sent to the referring provider.       Fredi Roth MD  Primary Care Sports Medicine        Disclaimer: This note was prepared using a voice recognition system and is likely to have sound alike errors within the text.

## 2022-10-12 NOTE — PROGRESS NOTES
Patient ID: Ivana Blair  YOB: 1985  MRN: [id number]    Chief Complaint: Pain of the Right Shoulder      Referred By: Self    ***School/Grade/Sport: ***    ***Occupation:       History of Present Illness: Ivana Blair is a right-hand dominant 37 y.o. female who presents today with 7/10 pain c/o right shoulder pain. She states it began in July 2022. She describes the pain as an intermittent sharp and throbbing pain. She also gets a rash when it gets inflamed. The rash leaves discoloration once the inflammation decreases. She has been using ice/heat alternation for relief. She states that driving and reaching backwards makes the pain worse.       The patient is active in none.      ***    Past Medical History:   Past Medical History:   Diagnosis Date    Allergy     sinus    Anxiety     B12 deficiency     Benign prolactinoma     s/p surgery    Depression     Hx of varicella     Insulin resistance 5/2/2019    Left wrist pain     Dr. Lam    Pituitary tumor     Vitamin D deficiency      Past Surgical History:   Procedure Laterality Date    ADENOIDECTOMY      2007    ADENOIDECTOMY  2007    BRAIN SURGERY  2015    COSMETIC SURGERY  03/31/2021    Breast Reduction (if that counts)    DENTAL SURGERY      LAPAROSCOPIC SLEEVE GASTRECTOMY  2022    NASAL JOSE MIGUEL BULLOSA RESECTION      2013    TENDON RELEASE Left     wrist    TONSILLECTOMY      2007    TOTAL REDUCTION MAMMOPLASTY      TUMOR REMOVAL  02/25/2015    Prolactinoma resected by Luz     Family History   Problem Relation Age of Onset    Anemia Mother     Gout Father     Anemia Sister     Alzheimer's disease Maternal Grandmother     Diabetes Maternal Grandmother     Early death Maternal Grandmother     Cancer Maternal Grandfather     Cataracts Maternal Grandfather     Hypertension Maternal Grandfather     Rheumatologic disease Paternal Grandmother     Cancer Paternal Grandfather         Lung Cancer    Heart disease  Maternal Aunt     Arthritis Maternal Aunt     Stroke Maternal Uncle     Breast cancer Neg Hx     Ovarian cancer Neg Hx     Colon cancer Neg Hx      Social History     Socioeconomic History    Marital status: Single    Number of children: 0   Occupational History    Occupation: supervisor at [employer]     Comment: [employer]   Tobacco Use    Smoking status: Never    Smokeless tobacco: Never   Substance and Sexual Activity    Alcohol use: No    Drug use: Never    Sexual activity: Not Currently     Partners: Male     Birth control/protection: Abstinence, None   Other Topics Concern    Are you pregnant or think you may be? No    Breast-feeding No   Social History Narrative    Wears a seatbelt.     Social Determinants of Health     Transportation Needs: Unknown    Lack of Transportation (Non-Medical): Patient refused   Physical Activity: Sufficiently Active    Days of Exercise per Week: 7 days    Minutes of Exercise per Session: 60 min   Stress: Stress Concern Present    Feeling of Stress : To some extent   Social Connections: Unknown    Frequency of Communication with Friends and Family: More than three times a week    Frequency of Social Gatherings with Friends and Family: Twice a week    Active Member of Clubs or Organizations: Yes    Attends Club or Organization Meetings: 1 to 4 times per year    Marital Status: Patient refused   Housing Stability: Unknown    Unable to Pay for Housing in the Last Year: Patient refused    Unstable Housing in the Last Year: Patient refused     Medication List with Changes/Refills   Current Medications    CABERGOLINE (DOSTINEX) 0.5 MG TABLET    Take one  tablet twice a week    CHOLECALCIFEROL, VITAMIN D3, 50 MCG (2,000 UNIT) CHEW        DESOGESTREL-ETHINYL ESTRADIOL (APRI) 0.15-0.03 MG PER TABLET    Take 1 tablet by mouth once daily.    HYDROQUINONE 4 % CREA    AAA of dark spots qd prn. Do not use for longer than 3 months.    LIDOCAINE-PRILOCAINE (EMLA) CREAM    Apply topically as needed (30  minutes prior to appointment for keloid.).    MULTIVITAMIN CAPSULE    Take 1 capsule by mouth once daily.    NASCOBAL 500 MCG/SPRAY NASAL SPRAY    every 7 days.     Review of patient's allergies indicates:   Allergen Reactions    Pollen,fermented Itching, Anxiety, Dermatitis, Other (See Comments), Rash and Shortness Of Breath    Pecan nut Itching and Other (See Comments)       Physical Exam:   There is no height or weight on file to calculate BMI.    Physical Exam      Detailed MSK exam:   Ortho/SPM Exam       Imaging:  X-ray Shoulder 2 or More Views Right  Narrative: EXAMINATION:  XR SHOULDER COMPLETE 2 OR MORE VIEWS RIGHT    CLINICAL HISTORY:  Pain in right shoulder    TECHNIQUE:  Two or three views of the right shoulder were performed.    COMPARISON:  None    FINDINGS:  Glenohumeral joint is maintained.  Degenerative changes at the AC joint with moderate inferior marginal spurring is noted.  No acute fracture or dislocation.  Soft tissues are normal.  The visualized lung zones are clear.  Impression: See above    Electronically signed by: Juan Antonio Amos MD  Date:    10/11/2022  Time:    08:01    ***    Relevant imaging results were reviewed and interpreted by me and per my read: ***    This was discussed with the patient and / or family today.       There are no Patient Instructions on file for this visit.        A copy of today's visit note has been sent to the referring provider.       Fredi Roth MD  Primary Care Sports Medicine        Disclaimer: This note was prepared using a voice recognition system and is likely to have sound alike errors within the text.     I spent a total of *** minutes on the day of the visit.This includes face to face time and non-face to face time preparing to see the patient (eg, review of tests), obtaining and/or reviewing separately obtained history, documenting clinical information in the electronic or other health record, independently interpreting results and communicating  results to the patient/family/caregiver, or care coordinator.

## 2022-10-12 NOTE — PROCEDURES
Large Joint Aspiration/Injection: R subacromial bursa    Date/Time: 10/12/2022 2:30 PM  Performed by: Fredi Roth MD  Authorized by: Fredi Roth MD     Consent Done?:  Yes (Verbal)  Indications:  Pain  Site marked: the procedure site was marked    Timeout: prior to procedure the correct patient, procedure, and site was verified    Prep: patient was prepped and draped in usual sterile fashion      Local anesthesia used?: Yes    Anesthesia:  Local infiltration  Local anesthetic:  Topical anesthetic, bupivacaine 0.5% without epinephrine and lidocaine 1% without epinephrine  Anesthetic total (ml):  4      Details:  Needle Size:  21 G  Ultrasonic Guidance for needle placement?: Yes    Images are saved and documented.  Approach:  Lateral  Location:  Shoulder  Site:  R subacromial bursa  Medications:  6 mg betamethasone acetate-betamethasone sodium phosphate 6 mg/mL  Patient tolerance:  Patient tolerated the procedure well with no immediate complications     Ultrasound guidance was used for needle localization. Images were saved and stored for documentation. The appropriate structures were visualized. Dynamic visualization of the needle was continuous throughout the procedures and maintained good position.     We discussed the proper protocols after the injection such as no submerging pools, baths tubs, or hot tubs for 48 hr.  Showering is okay today.  We also discussed that blood sugars can be elevated after an injection and asked patient to properly checked her sugars over the next few days and contact their PCP if there are any concerns.  We discussed red flags such as fevers, chills, red, warm, tender joint at the area of injection to please seek medical care immediately.

## 2022-10-12 NOTE — PATIENT INSTRUCTIONS
Assessment:  Ivana Blair is a 37 y.o. female with a chief complaint of Pain of the Right Shoulder      Encounter Diagnoses   Name Primary?    Impingement syndrome of right shoulder Yes    Tendinopathy of right rotator cuff     Arthritis of right acromioclavicular joint         Plan:  XR reviewed today, relatively unremarkable, with some minor degenerative changes.  Labs reviewed, A1c 5.3% with a history of prediabetes, GFR > 60  The patient's history, clinical exam, and imaging findings are consistent with subacromial impingement, rotator cuff tendinopathy, and AC joint arthrosis.   We have reviewed the natural history of this disorder and discussed treatment options.   We recommend conservative treatment.    Cortisone injection today to subacromial space 2/2/1 [place] under US guidance  Order PT at [facility] per patient request - 2-3x per week for 6-8 weeks     Follow-up: 3 months or sooner if there are any problems between now and then.    Thank you for choosing [facility] and Dr. Fredi Roth for your orthopedic & sports medicine care. It is our goal to provide you with exceptional care that will help keep you healthy, active, and get you back in the game.    Please do not hesitate to reach out to us via email, phone, or MyChart with any questions, concerns, or feedback.    If you are experiencing pain/discomfort ,or have questions after 5pm and would like to be connected to the [facility] on-call team, please call this number and specify which Sports Medicine provider is treating you: [phone]

## 2022-10-24 ENCOUNTER — PATIENT MESSAGE (OUTPATIENT)
Dept: SPORTS MEDICINE | Facility: CLINIC | Age: 37
End: 2022-10-24
Payer: COMMERCIAL

## 2022-11-02 ENCOUNTER — PATIENT MESSAGE (OUTPATIENT)
Dept: ORTHOPEDICS | Facility: CLINIC | Age: 37
End: 2022-11-02
Payer: COMMERCIAL

## 2022-11-16 ENCOUNTER — PATIENT MESSAGE (OUTPATIENT)
Dept: ORTHOPEDICS | Facility: CLINIC | Age: 37
End: 2022-11-16
Payer: COMMERCIAL

## 2022-11-30 ENCOUNTER — PATIENT MESSAGE (OUTPATIENT)
Dept: DERMATOLOGY | Facility: CLINIC | Age: 37
End: 2022-11-30
Payer: COMMERCIAL

## 2022-11-30 ENCOUNTER — TELEPHONE (OUTPATIENT)
Dept: DERMATOLOGY | Facility: CLINIC | Age: 37
End: 2022-11-30
Payer: COMMERCIAL

## 2022-11-30 NOTE — TELEPHONE ENCOUNTER
Attempted to call patient to reschedule appointment with Raven Boyle on 12/14/2022, she won't be in office until January. No answer. LVM.

## 2022-12-05 ENCOUNTER — PATIENT MESSAGE (OUTPATIENT)
Dept: SPORTS MEDICINE | Facility: CLINIC | Age: 37
End: 2022-12-05
Payer: COMMERCIAL

## 2023-02-01 ENCOUNTER — PATIENT MESSAGE (OUTPATIENT)
Dept: INTERNAL MEDICINE | Facility: CLINIC | Age: 38
End: 2023-02-01

## 2024-10-18 ENCOUNTER — TELEPHONE (OUTPATIENT)
Dept: INTERNAL MEDICINE | Facility: CLINIC | Age: 39
End: 2024-10-18
Payer: COMMERCIAL

## 2024-10-18 NOTE — TELEPHONE ENCOUNTER
----- Message from Analisa sent at 10/18/2024  9:12 AM CDT -----  Regarding: call back  Type: Patient Call Back    Who called: pt     What is the request in detail: requesting orders for annual labs and a call to schedule prior to upcoming annual/new pt appt     Can the clinic reply by [facility]? yes    Would the patient rather a call back or a response via [facility]? both    Best call back number: [phone]     Additional Information:

## 2024-10-18 NOTE — TELEPHONE ENCOUNTER
Spoke w/pt, informed pt that she needs to be seen in office before labs will be ordered d/t her 1st appt is to establish care. Pt verbalized understanding./LF

## 2024-10-28 ENCOUNTER — OFFICE VISIT (OUTPATIENT)
Facility: CLINIC | Age: 39
End: 2024-10-28
Payer: COMMERCIAL

## 2024-10-28 DIAGNOSIS — E55.9 VITAMIN D INSUFFICIENCY: ICD-10-CM

## 2024-10-28 DIAGNOSIS — Z86.018 HISTORY OF PROLACTINOMA: Primary | ICD-10-CM

## 2024-10-28 DIAGNOSIS — Z86.39 HISTORY OF VITAMIN D DEFICIENCY: ICD-10-CM

## 2024-10-28 DIAGNOSIS — Z90.3 HISTORY OF SLEEVE GASTRECTOMY: ICD-10-CM

## 2024-10-28 DIAGNOSIS — R73.03 PRE-DIABETES: ICD-10-CM

## 2024-10-28 DIAGNOSIS — N91.2 AMENORRHEA: ICD-10-CM

## 2024-10-28 PROCEDURE — G2211 COMPLEX E/M VISIT ADD ON: HCPCS | Mod: 95,,, | Performed by: STUDENT IN AN ORGANIZED HEALTH CARE EDUCATION/TRAINING PROGRAM

## 2024-10-28 PROCEDURE — 99204 OFFICE O/P NEW MOD 45 MIN: CPT | Mod: 95,,, | Performed by: STUDENT IN AN ORGANIZED HEALTH CARE EDUCATION/TRAINING PROGRAM

## 2024-10-30 ENCOUNTER — LAB VISIT (OUTPATIENT)
Dept: LAB | Facility: HOSPITAL | Age: 39
End: 2024-10-30
Attending: FAMILY MEDICINE
Payer: COMMERCIAL

## 2024-10-30 ENCOUNTER — OFFICE VISIT (OUTPATIENT)
Dept: INTERNAL MEDICINE | Facility: CLINIC | Age: 39
End: 2024-10-30
Payer: COMMERCIAL

## 2024-10-30 VITALS
OXYGEN SATURATION: 99 % | RESPIRATION RATE: 18 BRPM | WEIGHT: 261 LBS | HEIGHT: 67 IN | BODY MASS INDEX: 40.97 KG/M2 | SYSTOLIC BLOOD PRESSURE: 132 MMHG | DIASTOLIC BLOOD PRESSURE: 60 MMHG | HEART RATE: 52 BPM

## 2024-10-30 DIAGNOSIS — Z00.00 ROUTINE GENERAL MEDICAL EXAMINATION AT A HEALTH CARE FACILITY: Primary | ICD-10-CM

## 2024-10-30 DIAGNOSIS — E55.9 VITAMIN D INSUFFICIENCY: ICD-10-CM

## 2024-10-30 DIAGNOSIS — Z12.31 BREAST CANCER SCREENING BY MAMMOGRAM: ICD-10-CM

## 2024-10-30 DIAGNOSIS — Z91.09 ENVIRONMENTAL ALLERGIES: ICD-10-CM

## 2024-10-30 DIAGNOSIS — Z90.3 HISTORY OF SLEEVE GASTRECTOMY: ICD-10-CM

## 2024-10-30 DIAGNOSIS — E66.01 MORBID OBESITY WITH BMI OF 40.0-44.9, ADULT: ICD-10-CM

## 2024-10-30 DIAGNOSIS — Z86.018 HISTORY OF PROLACTINOMA: ICD-10-CM

## 2024-10-30 DIAGNOSIS — Z00.00 ROUTINE GENERAL MEDICAL EXAMINATION AT A HEALTH CARE FACILITY: ICD-10-CM

## 2024-10-30 PROBLEM — Z12.4 SCREENING FOR MALIGNANT NEOPLASM OF CERVIX: Status: RESOLVED | Noted: 2022-03-07 | Resolved: 2024-10-30

## 2024-10-30 PROBLEM — M85.88 OSTEOPENIA OF SPINE: Status: ACTIVE | Noted: 2021-04-12

## 2024-10-30 LAB
ALBUMIN SERPL BCP-MCNC: 3.8 G/DL (ref 3.5–5.2)
ALP SERPL-CCNC: 51 U/L (ref 40–150)
ALT SERPL W/O P-5'-P-CCNC: 13 U/L (ref 10–44)
ANION GAP SERPL CALC-SCNC: 10 MMOL/L (ref 8–16)
AST SERPL-CCNC: 19 U/L (ref 10–40)
BASOPHILS # BLD AUTO: 0.05 K/UL (ref 0–0.2)
BASOPHILS NFR BLD: 0.5 % (ref 0–1.9)
BILIRUB SERPL-MCNC: 0.3 MG/DL (ref 0.1–1)
BUN SERPL-MCNC: 9 MG/DL (ref 6–20)
CALCIUM SERPL-MCNC: 9.7 MG/DL (ref 8.7–10.5)
CHLORIDE SERPL-SCNC: 106 MMOL/L (ref 95–110)
CHOLEST SERPL-MCNC: 182 MG/DL (ref 120–199)
CHOLEST/HDLC SERPL: 2.5 {RATIO} (ref 2–5)
CO2 SERPL-SCNC: 24 MMOL/L (ref 23–29)
CREAT SERPL-MCNC: 1 MG/DL (ref 0.5–1.4)
DIFFERENTIAL METHOD BLD: ABNORMAL
EOSINOPHIL # BLD AUTO: 0.1 K/UL (ref 0–0.5)
EOSINOPHIL NFR BLD: 1.2 % (ref 0–8)
ERYTHROCYTE [DISTWIDTH] IN BLOOD BY AUTOMATED COUNT: 14.3 % (ref 11.5–14.5)
EST. GFR  (NO RACE VARIABLE): >60 ML/MIN/1.73 M^2
GLUCOSE SERPL-MCNC: 95 MG/DL (ref 70–110)
HCT VFR BLD AUTO: 40.2 % (ref 37–48.5)
HDLC SERPL-MCNC: 72 MG/DL (ref 40–75)
HDLC SERPL: 39.6 % (ref 20–50)
HGB BLD-MCNC: 12.3 G/DL (ref 12–16)
IMM GRANULOCYTES # BLD AUTO: 0.03 K/UL (ref 0–0.04)
IMM GRANULOCYTES NFR BLD AUTO: 0.3 % (ref 0–0.5)
LDLC SERPL CALC-MCNC: 96 MG/DL (ref 63–159)
LYMPHOCYTES # BLD AUTO: 3.6 K/UL (ref 1–4.8)
LYMPHOCYTES NFR BLD: 36.6 % (ref 18–48)
MCH RBC QN AUTO: 26.6 PG (ref 27–31)
MCHC RBC AUTO-ENTMCNC: 30.6 G/DL (ref 32–36)
MCV RBC AUTO: 87 FL (ref 82–98)
MONOCYTES # BLD AUTO: 0.6 K/UL (ref 0.3–1)
MONOCYTES NFR BLD: 5.9 % (ref 4–15)
NEUTROPHILS # BLD AUTO: 5.5 K/UL (ref 1.8–7.7)
NEUTROPHILS NFR BLD: 55.5 % (ref 38–73)
NONHDLC SERPL-MCNC: 110 MG/DL
NRBC BLD-RTO: 0 /100 WBC
PLATELET # BLD AUTO: 293 K/UL (ref 150–450)
PMV BLD AUTO: 11.5 FL (ref 9.2–12.9)
POTASSIUM SERPL-SCNC: 4.8 MMOL/L (ref 3.5–5.1)
PROT SERPL-MCNC: 7.3 G/DL (ref 6–8.4)
RBC # BLD AUTO: 4.62 M/UL (ref 4–5.4)
SODIUM SERPL-SCNC: 140 MMOL/L (ref 136–145)
TRIGL SERPL-MCNC: 70 MG/DL (ref 30–150)
TSH SERPL DL<=0.005 MIU/L-ACNC: 1.63 UIU/ML (ref 0.4–4)
WBC # BLD AUTO: 9.88 K/UL (ref 3.9–12.7)

## 2024-10-30 PROCEDURE — 99396 PREV VISIT EST AGE 40-64: CPT | Mod: S$GLB,,, | Performed by: FAMILY MEDICINE

## 2024-10-30 PROCEDURE — 1159F MED LIST DOCD IN RCRD: CPT | Mod: CPTII,S$GLB,, | Performed by: FAMILY MEDICINE

## 2024-10-30 PROCEDURE — 84443 ASSAY THYROID STIM HORMONE: CPT | Performed by: FAMILY MEDICINE

## 2024-10-30 PROCEDURE — 3078F DIAST BP <80 MM HG: CPT | Mod: CPTII,S$GLB,, | Performed by: FAMILY MEDICINE

## 2024-10-30 PROCEDURE — 80053 COMPREHEN METABOLIC PANEL: CPT | Performed by: FAMILY MEDICINE

## 2024-10-30 PROCEDURE — 99999 PR PBB SHADOW E&M-EST. PATIENT-LVL IV: CPT | Mod: PBBFAC,,, | Performed by: FAMILY MEDICINE

## 2024-10-30 PROCEDURE — 3008F BODY MASS INDEX DOCD: CPT | Mod: CPTII,S$GLB,, | Performed by: FAMILY MEDICINE

## 2024-10-30 PROCEDURE — 80061 LIPID PANEL: CPT | Performed by: FAMILY MEDICINE

## 2024-10-30 PROCEDURE — 36415 COLL VENOUS BLD VENIPUNCTURE: CPT | Performed by: FAMILY MEDICINE

## 2024-10-30 PROCEDURE — 3075F SYST BP GE 130 - 139MM HG: CPT | Mod: CPTII,S$GLB,, | Performed by: FAMILY MEDICINE

## 2024-10-30 PROCEDURE — 85025 COMPLETE CBC W/AUTO DIFF WBC: CPT | Performed by: FAMILY MEDICINE

## 2024-11-01 ENCOUNTER — LAB VISIT (OUTPATIENT)
Dept: LAB | Facility: HOSPITAL | Age: 39
End: 2024-11-01
Attending: STUDENT IN AN ORGANIZED HEALTH CARE EDUCATION/TRAINING PROGRAM
Payer: COMMERCIAL

## 2024-11-01 ENCOUNTER — PATIENT MESSAGE (OUTPATIENT)
Dept: ENDOCRINOLOGY | Facility: CLINIC | Age: 39
End: 2024-11-01
Payer: COMMERCIAL

## 2024-11-01 DIAGNOSIS — E22.1 HYPERPROLACTINEMIA: Primary | ICD-10-CM

## 2024-11-01 DIAGNOSIS — Z86.018 HISTORY OF PROLACTINOMA: ICD-10-CM

## 2024-11-01 DIAGNOSIS — R73.03 PRE-DIABETES: ICD-10-CM

## 2024-11-01 DIAGNOSIS — Z00.00 ROUTINE GENERAL MEDICAL EXAMINATION AT A HEALTH CARE FACILITY: ICD-10-CM

## 2024-11-01 DIAGNOSIS — Z86.39 HISTORY OF VITAMIN D DEFICIENCY: ICD-10-CM

## 2024-11-01 DIAGNOSIS — N91.2 AMENORRHEA: ICD-10-CM

## 2024-11-01 LAB
25(OH)D3+25(OH)D2 SERPL-MCNC: 8 NG/ML (ref 30–96)
ESTIMATED AVG GLUCOSE: 114 MG/DL (ref 68–131)
ESTIMATED AVG GLUCOSE: 117 MG/DL (ref 68–131)
ESTRADIOL SERPL-MCNC: 17 PG/ML
FSH SERPL-ACNC: 0.51 MIU/ML
HBA1C MFR BLD: 5.6 % (ref 4–5.6)
HBA1C MFR BLD: 5.7 % (ref 4–5.6)
LH SERPL-ACNC: 0.2 MIU/ML
PROLACTIN SERPL IA-MCNC: 56.7 NG/ML (ref 5.2–26.5)

## 2024-11-01 PROCEDURE — 82670 ASSAY OF TOTAL ESTRADIOL: CPT | Performed by: STUDENT IN AN ORGANIZED HEALTH CARE EDUCATION/TRAINING PROGRAM

## 2024-11-01 PROCEDURE — 82306 VITAMIN D 25 HYDROXY: CPT | Performed by: STUDENT IN AN ORGANIZED HEALTH CARE EDUCATION/TRAINING PROGRAM

## 2024-11-01 PROCEDURE — 83036 HEMOGLOBIN GLYCOSYLATED A1C: CPT | Performed by: FAMILY MEDICINE

## 2024-11-01 PROCEDURE — 83002 ASSAY OF GONADOTROPIN (LH): CPT | Performed by: STUDENT IN AN ORGANIZED HEALTH CARE EDUCATION/TRAINING PROGRAM

## 2024-11-01 PROCEDURE — 84305 ASSAY OF SOMATOMEDIN: CPT | Performed by: STUDENT IN AN ORGANIZED HEALTH CARE EDUCATION/TRAINING PROGRAM

## 2024-11-01 PROCEDURE — 83001 ASSAY OF GONADOTROPIN (FSH): CPT | Performed by: STUDENT IN AN ORGANIZED HEALTH CARE EDUCATION/TRAINING PROGRAM

## 2024-11-01 PROCEDURE — 83036 HEMOGLOBIN GLYCOSYLATED A1C: CPT | Mod: 91 | Performed by: STUDENT IN AN ORGANIZED HEALTH CARE EDUCATION/TRAINING PROGRAM

## 2024-11-01 PROCEDURE — 36415 COLL VENOUS BLD VENIPUNCTURE: CPT | Performed by: FAMILY MEDICINE

## 2024-11-01 PROCEDURE — 84146 ASSAY OF PROLACTIN: CPT | Performed by: STUDENT IN AN ORGANIZED HEALTH CARE EDUCATION/TRAINING PROGRAM

## 2024-11-01 RX ORDER — CABERGOLINE 0.5 MG/1
TABLET ORAL
Qty: 24 TABLET | Refills: 1 | Status: SHIPPED | OUTPATIENT
Start: 2024-11-01

## 2024-11-05 LAB
IGF-I SERPL-MCNC: 86 NG/ML (ref 54–258)
IGF-I Z-SCORE SERPL: -1.04 SD

## 2025-01-04 ENCOUNTER — LAB VISIT (OUTPATIENT)
Dept: LAB | Facility: HOSPITAL | Age: 40
End: 2025-01-04
Attending: STUDENT IN AN ORGANIZED HEALTH CARE EDUCATION/TRAINING PROGRAM
Payer: COMMERCIAL

## 2025-01-04 DIAGNOSIS — Z86.018 HISTORY OF PROLACTINOMA: ICD-10-CM

## 2025-01-04 DIAGNOSIS — E22.1 HYPERPROLACTINEMIA: ICD-10-CM

## 2025-01-04 LAB — PROLACTIN SERPL IA-MCNC: 46.6 NG/ML (ref 5.2–26.5)

## 2025-01-04 PROCEDURE — 36415 COLL VENOUS BLD VENIPUNCTURE: CPT | Performed by: STUDENT IN AN ORGANIZED HEALTH CARE EDUCATION/TRAINING PROGRAM

## 2025-01-04 PROCEDURE — 84146 ASSAY OF PROLACTIN: CPT | Performed by: STUDENT IN AN ORGANIZED HEALTH CARE EDUCATION/TRAINING PROGRAM

## 2025-01-06 ENCOUNTER — PATIENT MESSAGE (OUTPATIENT)
Facility: CLINIC | Age: 40
End: 2025-01-06
Payer: COMMERCIAL

## 2025-01-07 ENCOUNTER — PATIENT MESSAGE (OUTPATIENT)
Dept: INTERNAL MEDICINE | Facility: CLINIC | Age: 40
End: 2025-01-07
Payer: COMMERCIAL

## 2025-01-17 ENCOUNTER — PATIENT MESSAGE (OUTPATIENT)
Facility: CLINIC | Age: 40
End: 2025-01-17
Payer: COMMERCIAL

## 2025-01-17 ENCOUNTER — TELEPHONE (OUTPATIENT)
Dept: ENDOCRINOLOGY | Facility: CLINIC | Age: 40
End: 2025-01-17
Payer: COMMERCIAL

## 2025-01-17 DIAGNOSIS — E22.1 HYPERPROLACTINEMIA: ICD-10-CM

## 2025-01-17 DIAGNOSIS — Z86.018 HISTORY OF PROLACTINOMA: ICD-10-CM

## 2025-01-17 DIAGNOSIS — Z86.018 HISTORY OF PROLACTINOMA: Primary | ICD-10-CM

## 2025-01-17 RX ORDER — CABERGOLINE 0.5 MG/1
TABLET ORAL
Qty: 24 TABLET | Refills: 3 | Status: SHIPPED | OUTPATIENT
Start: 2025-01-17 | End: 2025-01-24

## 2025-01-24 ENCOUNTER — OFFICE VISIT (OUTPATIENT)
Dept: ENDOCRINOLOGY | Facility: CLINIC | Age: 40
End: 2025-01-24
Payer: COMMERCIAL

## 2025-01-24 VITALS
SYSTOLIC BLOOD PRESSURE: 133 MMHG | BODY MASS INDEX: 42.69 KG/M2 | HEART RATE: 68 BPM | DIASTOLIC BLOOD PRESSURE: 88 MMHG | TEMPERATURE: 99 F | WEIGHT: 268.5 LBS

## 2025-01-24 DIAGNOSIS — N91.2 AMENORRHEA: ICD-10-CM

## 2025-01-24 DIAGNOSIS — E22.1 HYPERPROLACTINEMIA: Primary | ICD-10-CM

## 2025-01-24 DIAGNOSIS — D35.2 PROLACTINOMA: ICD-10-CM

## 2025-01-24 DIAGNOSIS — E55.9 VITAMIN D DEFICIENCY: ICD-10-CM

## 2025-01-24 DIAGNOSIS — M85.88 OSTEOPENIA OF SPINE: ICD-10-CM

## 2025-01-24 DIAGNOSIS — Z86.018 HISTORY OF PROLACTINOMA: ICD-10-CM

## 2025-01-24 DIAGNOSIS — E55.9 VITAMIN D INSUFFICIENCY: ICD-10-CM

## 2025-01-24 DIAGNOSIS — N91.1 SECONDARY AMENORRHEA: ICD-10-CM

## 2025-01-24 PROCEDURE — 99999 PR PBB SHADOW E&M-EST. PATIENT-LVL III: CPT | Mod: PBBFAC,,, | Performed by: INTERNAL MEDICINE

## 2025-01-24 PROCEDURE — 3079F DIAST BP 80-89 MM HG: CPT | Mod: CPTII,S$GLB,, | Performed by: INTERNAL MEDICINE

## 2025-01-24 PROCEDURE — 3008F BODY MASS INDEX DOCD: CPT | Mod: CPTII,S$GLB,, | Performed by: INTERNAL MEDICINE

## 2025-01-24 PROCEDURE — 1160F RVW MEDS BY RX/DR IN RCRD: CPT | Mod: CPTII,S$GLB,, | Performed by: INTERNAL MEDICINE

## 2025-01-24 PROCEDURE — G2211 COMPLEX E/M VISIT ADD ON: HCPCS | Mod: S$GLB,,, | Performed by: INTERNAL MEDICINE

## 2025-01-24 PROCEDURE — 1159F MED LIST DOCD IN RCRD: CPT | Mod: CPTII,S$GLB,, | Performed by: INTERNAL MEDICINE

## 2025-01-24 PROCEDURE — 99214 OFFICE O/P EST MOD 30 MIN: CPT | Mod: S$GLB,,, | Performed by: INTERNAL MEDICINE

## 2025-01-24 PROCEDURE — 3075F SYST BP GE 130 - 139MM HG: CPT | Mod: CPTII,S$GLB,, | Performed by: INTERNAL MEDICINE

## 2025-01-24 RX ORDER — BUTALB/ACETAMINOPHEN/CAFFEINE 50-325-40
1 TABLET ORAL 2 TIMES DAILY
COMMUNITY

## 2025-01-24 RX ORDER — PERPHENAZINE 4 MG
TABLET ORAL
COMMUNITY
Start: 2025-01-05

## 2025-01-24 NOTE — ASSESSMENT & PLAN NOTE
Suspect she has some residual prolactin secreting tissue. She has suppressed gonadotropins, which is most likely due to excess PRL.   Follow-up the results of the pending MRI.  If it shows significant regrowth we may need to resume cabergoline.  However, her wish is to hold off on medical therapy unless necessary.  In that case, I have recommended follow-up with OBGYN to discuss hormone replacement therapy for the purposes of protecting her bones.

## 2025-01-24 NOTE — PROGRESS NOTES
FOLLOW-UP PATIENT VISIT    Subjective:      Chief Complaint: Prolactinoma    HPI: Ivana Blair is a 39 y.o. female who is here for prolactinoma      Past Medical History:   Diagnosis Date    Allergy     sinus    Anxiety     B12 deficiency     Benign prolactinoma     s/p surgery    Depression     Hx of varicella     Insulin resistance 5/2/2019    Left wrist pain     Dr. Lam    Pituitary tumor     Vitamin D deficiency      Recently saw Dr. Lazaro in 10/2024.    With regards to the hyperprolactinemia or prolactinoma:    Dx with prolactinoma around age 20. She initially presented with headaches and secondary amenorrhea and PRL was in the 180s. She recalls having cycles for about 1 year after puberty, then no further cycles after that. She was initially treated with medication (bromocriptine then cabergoline) but developed side effects. During that time she did have a brief resumption in menstrual cycles. She underwent TSR in 2015 ([facility]) and was on medical therapy post-op with cabergoline. She stopped cabergoline in March 2024. She did not have any cycles since undergoing TSR.    She saw Dr. Lazaro recently who prescribed cabergoline 0.5 mg twice weekly. However, she did not wish to resume taking it due to concern for side-effects. She is scheduled for a repeat MRI.    Last MRI was 5/2021 (report only):  Impression:   Patient is status post transsphenoidal resection of a pituitary lesion. There is some asymmetric enhancement along the floor of the sella on the right which is decreased compared to February 20, 2015 likely related to postoperative change. No enlarging soft tissue lesion.       DXA (Year)  Location  (T-scores) 11/1/2024  ([facility])   L-spine -1.4   Total Hip +0.6   Femoral Neck -0.4   FRAX (MOF  /  Hip) 0.7%  /  <0.1%     Lab Results   Component Value Date    MGYCQZHU62BJ 8 (L) 11/01/2024    HFSKRNRI71DI 11 (L) 05/10/2017     On Vitamin D3 - 1000 IU daily  On calcium 1200 mg per day  (gummies)      Lab Results   Component Value Date    PROLACTIN 46.6 (H) 01/04/2025    PROLACTIN 56.7 (H) 11/01/2024    PROLACTIN 18.7 03/07/2022    PROLACTIN 18.1 01/10/2022    PROLACTIN 11.3 07/19/2021    SOMATMDN 86 11/01/2024    SOMATMDN 98 06/30/2020    SOMATMDN 137 08/02/2018    TESTOSTERONE 16 08/12/2020    TESTOSTERONE 2.3 08/12/2020    TOTALTESTOST 30 12/19/2015    BIOTESTO 4.3 08/12/2020    TSH 1.631 10/30/2024    FREET4 0.76 06/30/2020    LABLH 0.2 11/01/2024    LABLH 2.1 01/04/2021    LABLH 1.1 08/12/2020    FSH 0.51 11/01/2024    FSH 6.5 04/12/2021    FSH 3.60 01/04/2021    ACTH 24 08/02/2018         Objective:     Vitals:    01/24/25 0933   BP: 133/88   Pulse: 68   Temp: 98.7 °F (37.1 °C)         BP Readings from Last 5 Encounters:   01/24/25 133/88   10/30/24 132/60   03/23/22 118/72   03/23/22 120/86   03/07/22 118/74         Physical Exam  Vitals and nursing note reviewed.   Constitutional:       General: She is not in acute distress.     Appearance: She is well-developed. She is not ill-appearing.   HENT:      Head: Normocephalic and atraumatic.   Neck:      Thyroid: No thyromegaly.      Trachea: No tracheal deviation.   Pulmonary:      Effort: Pulmonary effort is normal. No respiratory distress.   Neurological:      Mental Status: She is alert and oriented to person, place, and time.      Coordination: Coordination normal.   Psychiatric:         Mood and Affect: Mood normal.         Behavior: Behavior normal.           Wt Readings from Last 3 Encounters:   01/24/25 0933 121.8 kg (268 lb 8.3 oz)   10/30/24 1610 118.4 kg (261 lb 0.4 oz)   06/02/22 0936 104 kg (229 lb 3.2 oz)       Assessment/Plan:     Prolactinoma  Suspect she has some residual prolactin secreting tissue. She has suppressed gonadotropins, which is most likely due to excess PRL.   Follow-up the results of the pending MRI.  If it shows significant regrowth we may need to resume cabergoline.  However, her wish is to hold off on medical  therapy unless necessary.  In that case, I have recommended follow-up with OBGYN to discuss hormone replacement therapy for the purposes of protecting her bones.    Amenorrhea  See above.  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    Osteopenia of spine  Discussed the role of estrogen in preventing bone loss.  We will follow DEXA every two years.    Vitamin D insufficiency  Advised to increase vitamin D3 to 3000 IU daily.  Recheck vitamin-D with labs in March.      Follow up in about 1 year (around 1/24/2026).

## 2025-02-01 ENCOUNTER — LAB VISIT (OUTPATIENT)
Dept: LAB | Facility: HOSPITAL | Age: 40
End: 2025-02-01
Attending: FAMILY MEDICINE
Payer: COMMERCIAL

## 2025-02-01 ENCOUNTER — OFFICE VISIT (OUTPATIENT)
Dept: INTERNAL MEDICINE | Facility: CLINIC | Age: 40
End: 2025-02-01
Payer: COMMERCIAL

## 2025-02-01 VITALS
HEIGHT: 67 IN | SYSTOLIC BLOOD PRESSURE: 124 MMHG | TEMPERATURE: 98 F | HEART RATE: 89 BPM | BODY MASS INDEX: 42.25 KG/M2 | OXYGEN SATURATION: 97 % | WEIGHT: 269.19 LBS | DIASTOLIC BLOOD PRESSURE: 76 MMHG

## 2025-02-01 DIAGNOSIS — R73.03 PREDIABETES: Chronic | ICD-10-CM

## 2025-02-01 DIAGNOSIS — Z23 NEED FOR VACCINATION: ICD-10-CM

## 2025-02-01 DIAGNOSIS — R73.03 PREDIABETES: ICD-10-CM

## 2025-02-01 DIAGNOSIS — D35.2 PROLACTINOMA: Primary | Chronic | ICD-10-CM

## 2025-02-01 DIAGNOSIS — E66.01 MORBID OBESITY WITH BMI OF 40.0-44.9, ADULT: ICD-10-CM

## 2025-02-01 DIAGNOSIS — Z76.89 ENCOUNTER TO ESTABLISH CARE: ICD-10-CM

## 2025-02-01 DIAGNOSIS — L98.9 SKIN LESION: Chronic | ICD-10-CM

## 2025-02-01 DIAGNOSIS — M85.88 OSTEOPENIA OF SPINE: Chronic | ICD-10-CM

## 2025-02-01 DIAGNOSIS — E55.9 VITAMIN D DEFICIENCY: Chronic | ICD-10-CM

## 2025-02-01 DIAGNOSIS — E55.9 VITAMIN D DEFICIENCY: ICD-10-CM

## 2025-02-01 DIAGNOSIS — Z90.3 HISTORY OF SLEEVE GASTRECTOMY: Chronic | ICD-10-CM

## 2025-02-01 LAB
ESTIMATED AVG GLUCOSE: 111 MG/DL (ref 68–131)
HBA1C MFR BLD: 5.5 % (ref 4–5.6)

## 2025-02-01 PROCEDURE — 3008F BODY MASS INDEX DOCD: CPT | Mod: CPTII,S$GLB,, | Performed by: FAMILY MEDICINE

## 2025-02-01 PROCEDURE — 3074F SYST BP LT 130 MM HG: CPT | Mod: CPTII,S$GLB,, | Performed by: FAMILY MEDICINE

## 2025-02-01 PROCEDURE — 3044F HG A1C LEVEL LT 7.0%: CPT | Mod: CPTII,S$GLB,, | Performed by: FAMILY MEDICINE

## 2025-02-01 PROCEDURE — G2211 COMPLEX E/M VISIT ADD ON: HCPCS | Mod: S$GLB,,, | Performed by: FAMILY MEDICINE

## 2025-02-01 PROCEDURE — 36415 COLL VENOUS BLD VENIPUNCTURE: CPT | Mod: PO | Performed by: FAMILY MEDICINE

## 2025-02-01 PROCEDURE — 83036 HEMOGLOBIN GLYCOSYLATED A1C: CPT | Performed by: FAMILY MEDICINE

## 2025-02-01 PROCEDURE — 99999 PR PBB SHADOW E&M-EST. PATIENT-LVL IV: CPT | Mod: PBBFAC,,, | Performed by: FAMILY MEDICINE

## 2025-02-01 PROCEDURE — 82652 VIT D 1 25-DIHYDROXY: CPT | Performed by: FAMILY MEDICINE

## 2025-02-01 PROCEDURE — 90471 IMMUNIZATION ADMIN: CPT | Mod: S$GLB,,, | Performed by: FAMILY MEDICINE

## 2025-02-01 PROCEDURE — 3078F DIAST BP <80 MM HG: CPT | Mod: CPTII,S$GLB,, | Performed by: FAMILY MEDICINE

## 2025-02-01 PROCEDURE — 99214 OFFICE O/P EST MOD 30 MIN: CPT | Mod: 25,S$GLB,, | Performed by: FAMILY MEDICINE

## 2025-02-01 PROCEDURE — 90715 TDAP VACCINE 7 YRS/> IM: CPT | Mod: S$GLB,,, | Performed by: FAMILY MEDICINE

## 2025-02-01 NOTE — PROGRESS NOTES
Subjective     Patient ID: Ivana Blair is a 40 y.o. female.    Chief Complaint: Establish Care    History of Present Illness    Ivana presents for an established care visit to address multiple health concerns, including follow-up for a prolactinoma, dermatological issues, and post-gastric bypass complications.    Ivana, a 39-year-old female, was diagnosed with prolactinoma around age 21-22, with the tumor leaning over to the right optic nerve. She underwent surgery to remove the tumor, leaving a small portion to maintain some hormonal function. She had delayed puberty at age 20-21, followed by menopause. Ivana has not had regular menstrual cycles unless given hormone therapy.    Ivana reports dermatological issues, possibly related to her hormone levels, including a small keloid-like growth that initially presented as a blackhead and a sensitive, painful pilonidal cyst on her right breast.    Following gastric bypass surgery, the patient is currently heavier than her pre-surgery weight of 250 lbs. She has occasional discomfort in the surgical area, describing a pulling sensation when laughing hard or during certain movements, occurring every 2-3 months, especially when attempting exercise or abdominal workouts.    Ivana struggles with fatigue and mild depression, which she attributes to her prolactinoma and its impact on her life plans. She typically wakes up at 4:30 AM and goes to sleep around 11 PM, getting only 4-5 hours of sleep per night.    Recent A1C results indicate the patient is borderline pre-diabetic.    Ivana denies bone pain, chest pain, or shortness of breath.    MEDICATIONS:  Ivana is on Cabergoline (Dostinex), taking 0.5 tablet twice weekly on Sunday and Thursday nights for prolactinoma.    MEDICAL HISTORY:  Ivana has a history of prolactinoma, diagnosed around age 21-21, with symptoms present before diagnosis. She also has a history of pituitary adenoma/microadenoma and osteopenia. Ivana  has pre-diabetes with A1C level fluctuating between 5.6-5.7. Ivana's age at menarche was [age]. Her last menstrual period (LMP) is unknown. She has entered menopause at the age of 20-21. Ivana has not had a hysterectomy. She has used birth control in the past. Ivana has never been pregnant () and has no children. Her hormone replacement therapy (HRT) history includes prior use of Synthroid, Bromocriptin, and Cabergoline (Dalsinex).    FAMILY HISTORY:  Family history is significant for father with a history of nasal sinus pressure. Her mother has a history of migraines. Ivana's grandfather sleeps only 4-5 hours per night, which is similar to the patient's sleep pattern.    SURGICAL HISTORY:  Ivana has undergone pituitary tumor removal, which resulted in complications including an extended hospital stay of 15 days instead of the expected 3 days, and a near-death event. She has also had gastric bypass surgery, but reports being heavier now than before the surgery. Patient has had tendon release surgery for a titan tendon issue.    TEST RESULTS:  Ivana's prolactin levels were elevated in November/December, monitored by Dr. Mesa and decreasing. Her Vitamin D level was   extremely low at 8 (deficient range) in November. Recent A1C was 5.6-5.7, indicating borderline pre-diabetes. Recent cholesterol, TSH, chemistry panel (including sodium and potassium), liver function tests, and CBC were all within normal limits. Ivana underwent a DEXA scan, which indicated low bone density, consistent with the diagnosis of osteopenia.    IMAGING:  An MRI of the brain was performed around age 21-21, showing a tumor leaning over to the right optic nerve.    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 She is also an [employer] student. 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 She is currently single.    ROS:  General: -fever, -chills, +fatigue, -weight gain, -weight loss  Eyes: -vision changes,  -redness, -discharge  ENT: -ear pain, -nasal congestion, -sore throat  Cardiovascular: -chest pain, -palpitations, -lower extremity edema  Respiratory: -cough, -shortness of breath  Gastrointestinal: -abdominal pain, -nausea, -vomiting, -diarrhea, -constipation, -blood in stool  Genitourinary: -dysuria, -hematuria, -frequency  Musculoskeletal: -joint pain, +muscle pain  Skin: -rash, +lesion  Neurological: -headache, -dizziness, -numbness, -tingling  Psychiatric: -anxiety, +depression, -sleep difficulty            Objective     Physical Exam  Vitals and nursing note reviewed.   Constitutional:       Appearance: Normal appearance.   HENT:      Head: Normocephalic and atraumatic.   Pulmonary:      Effort: Pulmonary effort is normal.   Skin:     Comments: Raised area, size of a dime, right breast medial upper quadrant, slight pigmentation   Neurological:      General: No focal deficit present.      Mental Status: She is alert and oriented to person, place, and time.   Psychiatric:         Mood and Affect: Mood normal.         Behavior: Behavior normal.                Assessment and Plan     1. Prolactinoma  Comments:  stable on dostinex therapy  Overview:  S/p resection      2. Skin lesion  -     Ambulatory referral/consult to Dermatology; Future; Expected date: 02/08/2025    3. Vitamin D deficiency  -     Calcitriol; Future; Expected date: 02/01/2025    4. Prediabetes  Comments:  evaluate numbers to determine treatment  Orders:  -     HEMOGLOBIN A1C; Future; Expected date: 02/01/2025    5. History of sleeve gastrectomy  Overview:  Jan 2022      6. Osteopenia of spine  Overview:  4/27/21:  DEXA  AP SPINE L1-L4 T-score = -1.0; Z-score = -1.8   RIGHT HIP  NECK: T-score = 0.9; Z-score = 0.2  TOTAL: T-score = 2.2; Z-score = 1.3  LEFT HIP  NECK: T-score = 1.8; Z-score = 1.0  TOTAL: T-score = 2.6; Z-score = 1.6     FRAX ASSESSMENT:  Patient's 10-year risk of any fracture is 1.1%.  Patient's 10-year risk of hip fracture is  less than 0.1%.      7. Need for vaccination  -     Tdap (BOOSTRIX) vaccine injection 0.5 mL    8. Encounter to establish care    9. Morbid obesity with BMI of 40.0-44.9, adult  Comments:  cont healthy eating and physical activity  Overview:  Last Assessment & Plan:   She most certainly has insulin resistance and perhaps polycystic ovary disease along with her pituitary problem that may interfere with her menstrual cycling and future conception.    Plan: Fasting insulin level, fasting lipids, fasting BM P to look for impaired glucose tolerance.  Check hemoglobin A1c.  Consider metformin at time of follow-up visit.          Assessment & Plan    Assessed patient with history of prolactinoma and pituitary tumor  Reviewed recent lab work, including prolactin levels, A1C, cholesterol, thyroid, and CBC  Evaluated patient's pre-diabetic status with A1C fluctuating around 5.6-5.7  Considered possible mild hernia related to previous gastric bypass surgery    PATIENT EDUCATION:   Explained possible hernia symptoms and management strategies   Discussed importance of vitamin D supplementation for -Americans due to melanin affecting absorption rates   Explained benefits of juicing for nutrient absorption, especially for patients with history of bariatric surgery   Discussed iron intake and absorption for vegan diet, emphasizing importance of vitamin C    ACTION ITEMS/LIFESTYLE:   Ivana to wear a girdle or high-waisted tights to apply pressure to the abdominal area for hernia management   Ivana to avoid crunches and consider alternative core-strengthening exercises   Recommend incorporating resistance training exercises to help with osteopenia   Ivana to consider juicing to improve absorption of vitamins and minerals    MEDICATIONS:   Started OTC vitamin D3 supplement, 2,000 units daily for 3 months    ORDERS:   Vitamin D level ordered   Hemoglobin A1C ordered   Tetanus vaccine administered in office    REFERRALS:    Referred to dermatologist Dr. Iman Chauhan at [facility]    FOLLOW UP:   Follow up in 3 months   Contact the office if any questions arise before the next appointment              Follow up in about 3 months (around 5/1/2025).    This note was generated with the assistance of ambient listening technology. Verbal consent was obtained by the patient and accompanying visitor(s) for the recording of patient appointment to facilitate this note. I attest to having reviewed and edited the generated note for accuracy, though some syntax or spelling errors may persist. Please contact the author of this note for any clarification.

## 2025-02-02 ENCOUNTER — PATIENT MESSAGE (OUTPATIENT)
Dept: INTERNAL MEDICINE | Facility: CLINIC | Age: 40
End: 2025-02-02
Payer: COMMERCIAL

## 2025-02-05 ENCOUNTER — HOSPITAL ENCOUNTER (OUTPATIENT)
Dept: RADIOLOGY | Facility: HOSPITAL | Age: 40
Discharge: HOME | End: 2025-02-05
Attending: STUDENT IN AN ORGANIZED HEALTH CARE EDUCATION/TRAINING PROGRAM
Payer: COMMERCIAL

## 2025-02-05 DIAGNOSIS — Z86.018 HISTORY OF PROLACTINOMA: ICD-10-CM

## 2025-02-05 PROCEDURE — A9585 GADOBUTROL INJECTION: HCPCS | Performed by: STUDENT IN AN ORGANIZED HEALTH CARE EDUCATION/TRAINING PROGRAM

## 2025-02-05 PROCEDURE — 70553 MRI BRAIN STEM W/O & W/DYE: CPT | Mod: 26,,, | Performed by: STUDENT IN AN ORGANIZED HEALTH CARE EDUCATION/TRAINING PROGRAM

## 2025-02-05 PROCEDURE — 70553 MRI BRAIN STEM W/O & W/DYE: CPT | Mod: TC

## 2025-02-05 PROCEDURE — 25500020 PHARM REV CODE 255: Performed by: STUDENT IN AN ORGANIZED HEALTH CARE EDUCATION/TRAINING PROGRAM

## 2025-02-05 RX ORDER — GADOBUTROL 604.72 MG/ML
10 INJECTION INTRAVENOUS
Status: COMPLETED | OUTPATIENT
Start: 2025-02-05 | End: 2025-02-05

## 2025-02-05 RX ADMIN — GADOBUTROL 10 ML: 604.72 INJECTION INTRAVENOUS at 04:02

## 2025-02-06 LAB — 1,25(OH)2D3 SERPL-MCNC: 83 PG/ML (ref 20–79)

## 2025-02-10 ENCOUNTER — PATIENT MESSAGE (OUTPATIENT)
Dept: INTERNAL MEDICINE | Facility: CLINIC | Age: 40
End: 2025-02-10
Payer: COMMERCIAL

## 2025-02-11 ENCOUNTER — PATIENT MESSAGE (OUTPATIENT)
Dept: ENDOCRINOLOGY | Facility: CLINIC | Age: 40
End: 2025-02-11
Payer: COMMERCIAL

## 2025-02-11 ENCOUNTER — OFFICE VISIT (OUTPATIENT)
Dept: DERMATOLOGY | Facility: CLINIC | Age: 40
End: 2025-02-11
Payer: COMMERCIAL

## 2025-02-11 DIAGNOSIS — L81.9 DYSPIGMENTATION: ICD-10-CM

## 2025-02-11 DIAGNOSIS — L91.0 KELOID: Primary | ICD-10-CM

## 2025-02-11 PROCEDURE — 3044F HG A1C LEVEL LT 7.0%: CPT | Mod: CPTII,S$GLB,, | Performed by: STUDENT IN AN ORGANIZED HEALTH CARE EDUCATION/TRAINING PROGRAM

## 2025-02-11 PROCEDURE — 99214 OFFICE O/P EST MOD 30 MIN: CPT | Mod: 25,S$GLB,, | Performed by: STUDENT IN AN ORGANIZED HEALTH CARE EDUCATION/TRAINING PROGRAM

## 2025-02-11 PROCEDURE — 1160F RVW MEDS BY RX/DR IN RCRD: CPT | Mod: CPTII,S$GLB,, | Performed by: STUDENT IN AN ORGANIZED HEALTH CARE EDUCATION/TRAINING PROGRAM

## 2025-02-11 PROCEDURE — 1159F MED LIST DOCD IN RCRD: CPT | Mod: CPTII,S$GLB,, | Performed by: STUDENT IN AN ORGANIZED HEALTH CARE EDUCATION/TRAINING PROGRAM

## 2025-02-11 PROCEDURE — 11900 INJECT SKIN LESIONS </W 7: CPT | Mod: S$GLB,,, | Performed by: STUDENT IN AN ORGANIZED HEALTH CARE EDUCATION/TRAINING PROGRAM

## 2025-02-11 PROCEDURE — 99999 PR PBB SHADOW E&M-EST. PATIENT-LVL III: CPT | Mod: PBBFAC,,, | Performed by: STUDENT IN AN ORGANIZED HEALTH CARE EDUCATION/TRAINING PROGRAM

## 2025-02-11 NOTE — PROGRESS NOTES
Subjective:       Patient ID:  Ivana Blair is a 40 y.o. female who presents for   Chief Complaint   Patient presents with    Lesion     C/o skin lesion that itchy and irritated on right breast.    Nail Problem     C/o nail color discoloration    Dry Skin     History of Present Illness: The patient presents with chief complaint of possible cyst or scar.  Location: right chest and on the abdomen  Duration: lesion on the chest present for several months; lesion on the abdomen present for year  Signs/Symptoms: both with firm dark to flesh colored nodules. Lesion on the chest has gotten larger, and more itchy and irritated with time.   Prior treatments: none. Patient reports that the lesion on the chest started as a small bump, but she did try to squeeze/manipulate the area and has since grown.     Patient also reports having darkened discolored scarring on the right lower leg from recent ant bites. Reports darkened discolored patches left behind. Denies any recent symptoms, but would like recommend to lighten the area up some.     Nail Problem    Dry Skin        Review of Systems   Constitutional:  Negative for fever and chills.   Skin:  Positive for dry skin.        Objective:    Physical Exam   Constitutional: She appears well-developed and well-nourished. No distress.   Neurological: She is alert and oriented to person, place, and time. She is not disoriented.   Psychiatric: She has a normal mood and affect.   Skin:   Areas Examined (abnormalities noted in diagram):   Head / Face Inspection Performed  Neck Inspection Performed  Chest / Axilla Inspection Performed  Abdomen Inspection Performed  RUE Inspected  LUE Inspection Performed  RLE Inspected  LLE Inspection Performed              Diagram Legend     Erythematous scaling macule/papule c/w actinic keratosis       Vascular papule c/w angioma      Pigmented verrucoid papule/plaque c/w seborrheic keratosis      Yellow umbilicated papule c/w sebaceous  hyperplasia      Irregularly shaped tan macule c/w lentigo     1-2 mm smooth white papules consistent with Milia      Movable subcutaneous cyst with punctum c/w epidermal inclusion cyst      Subcutaneous movable cyst c/w pilar cyst      Firm pink to brown papule c/w dermatofibroma      Pedunculated fleshy papule(s) c/w skin tag(s)      Evenly pigmented macule c/w junctional nevus     Mildly variegated pigmented, slightly irregular-bordered macule c/w mildly atypical nevus      Flesh colored to evenly pigmented papule c/w intradermal nevus       Pink pearly papule/plaque c/w basal cell carcinoma      Erythematous hyperkeratotic cursted plaque c/w SCC      Surgical scar with no sign of skin cancer recurrence      Open and closed comedones      Inflammatory papules and pustules      Verrucoid papule consistent consistent with wart     Erythematous eczematous patches and plaques     Dystrophic onycholytic nail with subungual debris c/w onychomycosis     Umbilicated papule    Erythematous-base heme-crusted tan verrucoid plaque consistent with inflamed seborrheic keratosis     Erythematous Silvery Scaling Plaque c/w Psoriasis     See annotation      Assessment / Plan:        Keloid - Discussed treatment options. Will proceed with ILK injections. Injection #1 today.   -     triamcinolone acetonide injection 10 mg    Intralesional Kenalog 10mg/cc (0.5 cc total) injected into 2 lesions on the right chest and abdomen today after obtaining verbal consent including risk of surrounding hypopigmentation. Patient tolerated procedure well.    NDC for Kenalog 10mg/cc:  6358-8633-27    Dyspigmentation - lower leg, post inflammatory changes. Recommend OTC retinoid cream to help with symptoms.              Follow up in about 3 months (around 5/11/2025).

## 2025-02-11 NOTE — PATIENT INSTRUCTIONS

## 2025-02-14 ENCOUNTER — PATIENT MESSAGE (OUTPATIENT)
Facility: CLINIC | Age: 40
End: 2025-02-14
Payer: COMMERCIAL

## 2025-02-17 ENCOUNTER — HOSPITAL ENCOUNTER (OUTPATIENT)
Dept: RADIOLOGY | Facility: HOSPITAL | Age: 40
Discharge: HOME | End: 2025-02-17
Attending: FAMILY MEDICINE
Payer: COMMERCIAL

## 2025-02-17 VITALS — WEIGHT: 268.94 LBS | BODY MASS INDEX: 43.22 KG/M2 | HEIGHT: 66 IN

## 2025-02-17 PROCEDURE — 77067 SCR MAMMO BI INCL CAD: CPT | Mod: 26,,, | Performed by: RADIOLOGY

## 2025-02-17 PROCEDURE — 77067 SCR MAMMO BI INCL CAD: CPT | Mod: TC

## 2025-02-17 PROCEDURE — 77063 BREAST TOMOSYNTHESIS BI: CPT | Mod: 26,,, | Performed by: RADIOLOGY

## 2025-02-25 ENCOUNTER — TELEPHONE (OUTPATIENT)
Dept: OBSTETRICS AND GYNECOLOGY | Facility: CLINIC | Age: 40
End: 2025-02-25
Payer: COMMERCIAL

## 2025-02-25 NOTE — TELEPHONE ENCOUNTER
Lvm for pt to return call to get appointment scheduled with our department.   Received referral for secondary amenorrhea.  If pt returns call to schedule appt please link referral to appt.

## 2025-02-26 ENCOUNTER — LAB VISIT (OUTPATIENT)
Dept: LAB | Facility: HOSPITAL | Age: 40
End: 2025-02-26
Attending: OBSTETRICS & GYNECOLOGY
Payer: COMMERCIAL

## 2025-02-26 ENCOUNTER — OFFICE VISIT (OUTPATIENT)
Dept: OBSTETRICS AND GYNECOLOGY | Facility: CLINIC | Age: 40
End: 2025-02-26
Payer: COMMERCIAL

## 2025-02-26 VITALS
SYSTOLIC BLOOD PRESSURE: 144 MMHG | HEIGHT: 66 IN | BODY MASS INDEX: 43.65 KG/M2 | DIASTOLIC BLOOD PRESSURE: 95 MMHG | WEIGHT: 271.63 LBS

## 2025-02-26 DIAGNOSIS — Z31.69 ENCOUNTER FOR PRECONCEPTION CONSULTATION: ICD-10-CM

## 2025-02-26 DIAGNOSIS — Z01.419 ENCOUNTER FOR GYNECOLOGICAL EXAMINATION (GENERAL) (ROUTINE) WITHOUT ABNORMAL FINDINGS: Primary | ICD-10-CM

## 2025-02-26 DIAGNOSIS — Z12.4 SCREENING FOR CERVICAL CANCER: ICD-10-CM

## 2025-02-26 DIAGNOSIS — N91.1 SECONDARY AMENORRHEA: ICD-10-CM

## 2025-02-26 DIAGNOSIS — Z12.31 SCREENING MAMMOGRAM, ENCOUNTER FOR: ICD-10-CM

## 2025-02-26 PROCEDURE — 3077F SYST BP >= 140 MM HG: CPT | Mod: CPTII,S$GLB,, | Performed by: OBSTETRICS & GYNECOLOGY

## 2025-02-26 PROCEDURE — 3080F DIAST BP >= 90 MM HG: CPT | Mod: CPTII,S$GLB,, | Performed by: OBSTETRICS & GYNECOLOGY

## 2025-02-26 PROCEDURE — 36415 COLL VENOUS BLD VENIPUNCTURE: CPT | Performed by: OBSTETRICS & GYNECOLOGY

## 2025-02-26 PROCEDURE — 99999 PR PBB SHADOW E&M-EST. PATIENT-LVL III: CPT | Mod: PBBFAC,,, | Performed by: OBSTETRICS & GYNECOLOGY

## 2025-02-26 PROCEDURE — 3008F BODY MASS INDEX DOCD: CPT | Mod: CPTII,S$GLB,, | Performed by: OBSTETRICS & GYNECOLOGY

## 2025-02-26 PROCEDURE — 99396 PREV VISIT EST AGE 40-64: CPT | Mod: S$GLB,,, | Performed by: OBSTETRICS & GYNECOLOGY

## 2025-02-26 PROCEDURE — 82166 ASSAY ANTI-MULLERIAN HORM: CPT | Performed by: OBSTETRICS & GYNECOLOGY

## 2025-02-26 PROCEDURE — 1159F MED LIST DOCD IN RCRD: CPT | Mod: CPTII,S$GLB,, | Performed by: OBSTETRICS & GYNECOLOGY

## 2025-02-26 PROCEDURE — 3044F HG A1C LEVEL LT 7.0%: CPT | Mod: CPTII,S$GLB,, | Performed by: OBSTETRICS & GYNECOLOGY

## 2025-02-26 NOTE — PROGRESS NOTES
Subjective:       Patient ID: Ivana Blair is a 40 y.o. female.    Chief Complaint:  Well Woman      History of Present Illness  HPI  Annual Exam-Premenopausal  Patient presents for annual exam. The patient has no complaints today. The patient is not currently sexually active. GYN screening history: last pap: approximate date 2021 and was normal and last mammogram: approximate date 2025 and was normal. The patient wears seatbelts: yes. The patient participates in regular exercise: yes.plans cardio/strength--4d/wk; Has the patient ever been transfused or tattooed?: yes--+tattooes; . The patient reports that there is not domestic violence in her life.  Problems staying asleep  Denies urinary leakage  Denies hot flushes;   Previously on donstinex--stopped due to other side effects  GYN & OB History  No LMP recorded. (Menstrual status: Other). Amenorrhea Age 13 due to elevated prolactin, microadenoma    Date of Last Pap: 2025    OB History    Para Term  AB Living   0 0 0 0 0 0   SAB IAB Ectopic Multiple Live Births   0 0 0 0        Review of Systems  Review of Systems   Genitourinary:  Positive for menstrual problem.   Psychiatric/Behavioral:  Positive for sleep disturbance.    All other systems reviewed and are negative.          Objective:      Physical Exam:   Constitutional: She is oriented to person, place, and time. She appears well-developed and well-nourished.     Eyes: Pupils are equal, round, and reactive to light. Conjunctivae and EOM are normal.      Pulmonary/Chest: Effort normal. Right breast exhibits no mass, no nipple discharge, no skin change and no tenderness. Left breast exhibits no mass, no nipple discharge, no skin change and no tenderness. Breasts are symmetrical.        Abdominal: Soft.     Genitourinary:    Inguinal canal, urethra, bladder, vagina, uterus, right adnexa, left adnexa and rectum normal.      Pelvic exam was performed with patient supine.   The external  female genitalia was normal.     Labial bartholins normal.Cervix is normal. Right adnexum displays no mass and no tenderness. Left adnexum displays no mass and no tenderness. No erythema, vaginal discharge, bleeding, rectocele, cystocele or prolapse of vaginal walls in the vagina. Vagina was moist.   pap smear completedUerus contour normal  Normal urethral meatus.Urethra findings: no urethral massBladder findings: no bladder distention and no bladder tenderness   Genitourinary Comments: Difficult to assess due to obese abdomen             Musculoskeletal: Normal range of motion and moves all extremeties.       Neurological: She is alert and oriented to person, place, and time.    Skin: Skin is warm.    Psychiatric: She has a normal mood and affect. Her behavior is normal.           Assessment:        1. Encounter for gynecological examination (general) (routine) without abnormal findings    2. Secondary amenorrhea    3. Screening for cervical cancer    4. Screening mammogram, encounter for    5. Encounter for preconception consultation               Plan:      Continue annual well woman exam.  Pap today; Reviewed updated recommendations for pap smears (every 3 years) in low risk patients.   Recommend annual pelvic exams.  Reviewed recommendations for annual CBE.  mammo ordered, continue yearly until age 74  Amh level today  Continue diet, exercise, weight loss

## 2025-02-27 ENCOUNTER — PATIENT MESSAGE (OUTPATIENT)
Dept: OBSTETRICS AND GYNECOLOGY | Facility: CLINIC | Age: 40
End: 2025-02-27
Payer: COMMERCIAL

## 2025-02-28 ENCOUNTER — TELEPHONE (OUTPATIENT)
Dept: OBSTETRICS AND GYNECOLOGY | Facility: CLINIC | Age: 40
End: 2025-02-28
Payer: COMMERCIAL

## 2025-02-28 ENCOUNTER — RESULTS FOLLOW-UP (OUTPATIENT)
Dept: OBSTETRICS AND GYNECOLOGY | Facility: CLINIC | Age: 40
End: 2025-02-28
Payer: COMMERCIAL

## 2025-02-28 LAB — MIS SERPL-MCNC: 0.98 NG/ML (ref 0.03–5.5)

## 2025-02-28 NOTE — TELEPHONE ENCOUNTER
Attempted to contact patient. No answer, unable to leave voice mail.     ----- Message from Sena sent at 2/28/2025  9:29 AM CST -----  Contact: SOPHIA  .TYPE: Patient Call BackWho called:PATIENTWhat is the request in detail:  PATIENT CALL IN CONCERNING LABS THAT WERE DONE ON 02/262025  AND NEEDS RESULTS. PLEASE GIVE CALL BACK Can the clinic reply by [facility]?   Would the patient rather a call back or a response via [facility]:.[phone] (home)

## 2025-04-09 ENCOUNTER — PATIENT MESSAGE (OUTPATIENT)
Dept: OBSTETRICS AND GYNECOLOGY | Facility: CLINIC | Age: 40
End: 2025-04-09

## 2025-04-09 ENCOUNTER — RESULTS FOLLOW-UP (OUTPATIENT)
Dept: ENDOCRINOLOGY | Facility: CLINIC | Age: 40
End: 2025-04-09

## 2025-04-09 ENCOUNTER — LAB VISIT (OUTPATIENT)
Dept: LAB | Facility: HOSPITAL | Age: 40
End: 2025-04-09
Attending: STUDENT IN AN ORGANIZED HEALTH CARE EDUCATION/TRAINING PROGRAM

## 2025-04-09 ENCOUNTER — PATIENT MESSAGE (OUTPATIENT)
Dept: ENDOCRINOLOGY | Facility: CLINIC | Age: 40
End: 2025-04-09

## 2025-04-09 DIAGNOSIS — Z86.018 HISTORY OF PROLACTINOMA: ICD-10-CM

## 2025-04-09 DIAGNOSIS — E22.1 HYPERPROLACTINEMIA: ICD-10-CM

## 2025-04-09 DIAGNOSIS — E55.9 VITAMIN D DEFICIENCY: ICD-10-CM

## 2025-04-09 DIAGNOSIS — E55.9 VITAMIN D DEFICIENCY: Primary | ICD-10-CM

## 2025-04-09 LAB
25(OH)D3+25(OH)D2 SERPL-MCNC: 11 NG/ML (ref 30–96)
PROLACTIN SERPL IA-MCNC: 63.7 NG/ML (ref 5.2–26.5)
T4 FREE SERPL-MCNC: 0.82 NG/DL (ref 0.71–1.51)

## 2025-04-09 PROCEDURE — 84146 ASSAY OF PROLACTIN: CPT

## 2025-04-09 PROCEDURE — 36415 COLL VENOUS BLD VENIPUNCTURE: CPT

## 2025-04-09 PROCEDURE — 84439 ASSAY OF FREE THYROXINE: CPT

## 2025-04-09 PROCEDURE — 82306 VITAMIN D 25 HYDROXY: CPT

## 2025-04-10 ENCOUNTER — PATIENT MESSAGE (OUTPATIENT)
Dept: ENDOCRINOLOGY | Facility: HOSPITAL | Age: 40
End: 2025-04-10

## 2025-04-10 RX ORDER — VIT C/E/ZN/COPPR/LUTEIN/ZEAXAN 250MG-90MG
4000 CAPSULE ORAL DAILY
Start: 2025-04-10

## 2025-04-10 NOTE — TELEPHONE ENCOUNTER
Lab Results   Component Value Date    [id number] 11 (L) 04/09/2025    [id number] 8 (L) 11/01/2024    [id number] 11 (L) 05/10/2017     Current dose of Vitamin D is 1000 IU daily.    Increase to 4000 IU daily.  Recheck vitamin D  in 3 months.

## 2025-05-08 ENCOUNTER — PATIENT MESSAGE (OUTPATIENT)
Dept: RESEARCH | Facility: HOSPITAL | Age: 40
End: 2025-05-08

## 2025-08-07 ENCOUNTER — PATIENT MESSAGE (OUTPATIENT)
Dept: ENDOCRINOLOGY | Facility: CLINIC | Age: 40
End: 2025-08-07
Payer: COMMERCIAL

## 2025-08-07 DIAGNOSIS — E55.9 VITAMIN D DEFICIENCY: ICD-10-CM

## 2025-08-07 DIAGNOSIS — R73.03 PRE-DIABETES: ICD-10-CM

## 2025-08-07 DIAGNOSIS — E22.1 HYPERPROLACTINEMIA: Primary | ICD-10-CM

## 2025-08-08 DIAGNOSIS — E55.9 VITAMIN D DEFICIENCY: ICD-10-CM

## 2025-08-11 RX ORDER — CABERGOLINE 0.5 MG/1
0.5 TABLET ORAL
Qty: 24 TABLET | Refills: 3 | Status: SHIPPED | OUTPATIENT
Start: 2025-08-11 | End: 2026-08-11

## 2025-08-11 RX ORDER — VIT C/E/ZN/COPPR/LUTEIN/ZEAXAN 250MG-90MG
4000 CAPSULE ORAL DAILY
Qty: 360 CAPSULE | Refills: 3 | Status: SHIPPED | OUTPATIENT
Start: 2025-08-11

## 2025-08-28 ENCOUNTER — LAB VISIT (OUTPATIENT)
Dept: LAB | Facility: HOSPITAL | Age: 40
End: 2025-08-28
Attending: FAMILY MEDICINE
Payer: COMMERCIAL

## 2025-08-28 DIAGNOSIS — E22.1 HYPERPROLACTINEMIA: ICD-10-CM

## 2025-08-28 DIAGNOSIS — Z86.018 HISTORY OF PROLACTINOMA: ICD-10-CM

## 2025-08-28 DIAGNOSIS — R73.03 PRE-DIABETES: ICD-10-CM

## 2025-08-28 DIAGNOSIS — E55.9 VITAMIN D DEFICIENCY: ICD-10-CM

## 2025-08-28 LAB
25(OH)D3+25(OH)D2 SERPL-MCNC: 38 NG/ML (ref 30–96)
ALBUMIN SERPL BCP-MCNC: 4 G/DL (ref 3.5–5.2)
ANION GAP (OHS): 16 MMOL/L (ref 8–16)
BUN SERPL-MCNC: 13 MG/DL (ref 6–20)
CA-I BLD-SCNC: 1.18 MMOL/L (ref 1.06–1.42)
CALCIUM SERPL-MCNC: 9.6 MG/DL (ref 8.7–10.5)
CHLORIDE SERPL-SCNC: 108 MMOL/L (ref 95–110)
CO2 SERPL-SCNC: 19 MMOL/L (ref 23–29)
CREAT SERPL-MCNC: 1.3 MG/DL (ref 0.5–1.4)
EAG (OHS): 120 MG/DL (ref 68–131)
GFR SERPLBLD CREATININE-BSD FMLA CKD-EPI: 53 ML/MIN/1.73/M2
GLUCOSE SERPL-MCNC: 76 MG/DL (ref 70–110)
HBA1C MFR BLD: 5.8 % (ref 4–5.6)
PHOSPHATE SERPL-MCNC: 3.5 MG/DL (ref 2.7–4.5)
POTASSIUM SERPL-SCNC: 4.1 MMOL/L (ref 3.5–5.1)
PROLACTIN SERPL IA-MCNC: 61.2 NG/ML (ref 5.2–26.5)
SODIUM SERPL-SCNC: 143 MMOL/L (ref 136–145)

## 2025-08-28 PROCEDURE — 82330 ASSAY OF CALCIUM: CPT

## 2025-08-28 PROCEDURE — 36415 COLL VENOUS BLD VENIPUNCTURE: CPT | Mod: PO

## 2025-08-28 PROCEDURE — 83036 HEMOGLOBIN GLYCOSYLATED A1C: CPT

## 2025-08-28 PROCEDURE — 82306 VITAMIN D 25 HYDROXY: CPT

## 2025-08-28 PROCEDURE — 80069 RENAL FUNCTION PANEL: CPT

## 2025-08-28 PROCEDURE — 84146 ASSAY OF PROLACTIN: CPT

## 2025-08-29 ENCOUNTER — PATIENT MESSAGE (OUTPATIENT)
Dept: DIABETES | Facility: CLINIC | Age: 40
End: 2025-08-29
Payer: COMMERCIAL